# Patient Record
Sex: FEMALE | Race: OTHER | HISPANIC OR LATINO | URBAN - METROPOLITAN AREA
[De-identification: names, ages, dates, MRNs, and addresses within clinical notes are randomized per-mention and may not be internally consistent; named-entity substitution may affect disease eponyms.]

---

## 2017-12-31 ENCOUNTER — INPATIENT (INPATIENT)
Facility: HOSPITAL | Age: 67
LOS: 2 days | Discharge: NURSING, REHAB OR LONG-TERM CARE | DRG: 470 | End: 2018-01-03
Attending: ORTHOPAEDIC SURGERY | Admitting: ORTHOPAEDIC SURGERY
Payer: MEDICARE

## 2017-12-31 VITALS
SYSTOLIC BLOOD PRESSURE: 150 MMHG | WEIGHT: 160.06 LBS | TEMPERATURE: 98 F | HEART RATE: 73 BPM | RESPIRATION RATE: 22 BRPM | DIASTOLIC BLOOD PRESSURE: 74 MMHG | HEIGHT: 63 IN | OXYGEN SATURATION: 94 %

## 2017-12-31 LAB
ALBUMIN SERPL ELPH-MCNC: 4.4 G/DL — SIGNIFICANT CHANGE UP (ref 3.3–5)
ALP SERPL-CCNC: 100 U/L — SIGNIFICANT CHANGE UP (ref 40–120)
ALT FLD-CCNC: 15 U/L — SIGNIFICANT CHANGE UP (ref 10–45)
ANION GAP SERPL CALC-SCNC: 15 MMOL/L — SIGNIFICANT CHANGE UP (ref 5–17)
APPEARANCE UR: CLEAR — SIGNIFICANT CHANGE UP
APTT BLD: 28.3 SEC — SIGNIFICANT CHANGE UP (ref 27.5–37.4)
AST SERPL-CCNC: 20 U/L — SIGNIFICANT CHANGE UP (ref 10–40)
BASOPHILS NFR BLD AUTO: 0.2 % — SIGNIFICANT CHANGE UP (ref 0–2)
BILIRUB SERPL-MCNC: 0.4 MG/DL — SIGNIFICANT CHANGE UP (ref 0.2–1.2)
BILIRUB UR-MCNC: NEGATIVE — SIGNIFICANT CHANGE UP
BLD GP AB SCN SERPL QL: NEGATIVE — SIGNIFICANT CHANGE UP
BUN SERPL-MCNC: 16 MG/DL — SIGNIFICANT CHANGE UP (ref 7–23)
CALCIUM SERPL-MCNC: 9.7 MG/DL — SIGNIFICANT CHANGE UP (ref 8.4–10.5)
CHLORIDE SERPL-SCNC: 99 MMOL/L — SIGNIFICANT CHANGE UP (ref 96–108)
CK MB CFR SERPL CALC: 2.2 NG/ML — SIGNIFICANT CHANGE UP (ref 0–6.7)
CK SERPL-CCNC: 132 U/L — SIGNIFICANT CHANGE UP (ref 25–170)
CO2 SERPL-SCNC: 25 MMOL/L — SIGNIFICANT CHANGE UP (ref 22–31)
COLOR SPEC: YELLOW — SIGNIFICANT CHANGE UP
CREAT SERPL-MCNC: 0.67 MG/DL — SIGNIFICANT CHANGE UP (ref 0.5–1.3)
DIFF PNL FLD: NEGATIVE — SIGNIFICANT CHANGE UP
EOSINOPHIL NFR BLD AUTO: 1.8 % — SIGNIFICANT CHANGE UP (ref 0–6)
GLUCOSE BLDC GLUCOMTR-MCNC: 122 MG/DL — HIGH (ref 70–99)
GLUCOSE BLDC GLUCOMTR-MCNC: 160 MG/DL — HIGH (ref 70–99)
GLUCOSE SERPL-MCNC: 127 MG/DL — HIGH (ref 70–99)
GLUCOSE UR QL: NEGATIVE — SIGNIFICANT CHANGE UP
HCG UR QL: NEGATIVE — SIGNIFICANT CHANGE UP
HCT VFR BLD CALC: 37.7 % — SIGNIFICANT CHANGE UP (ref 34.5–45)
HGB BLD-MCNC: 12.7 G/DL — SIGNIFICANT CHANGE UP (ref 11.5–15.5)
INR BLD: 1.01 — SIGNIFICANT CHANGE UP (ref 0.88–1.16)
KETONES UR-MCNC: NEGATIVE — SIGNIFICANT CHANGE UP
LEUKOCYTE ESTERASE UR-ACNC: NEGATIVE — SIGNIFICANT CHANGE UP
LYMPHOCYTES # BLD AUTO: 17.7 % — SIGNIFICANT CHANGE UP (ref 13–44)
MCHC RBC-ENTMCNC: 30.9 PG — SIGNIFICANT CHANGE UP (ref 27–34)
MCHC RBC-ENTMCNC: 33.7 G/DL — SIGNIFICANT CHANGE UP (ref 32–36)
MCV RBC AUTO: 91.7 FL — SIGNIFICANT CHANGE UP (ref 80–100)
MONOCYTES NFR BLD AUTO: 7.6 % — SIGNIFICANT CHANGE UP (ref 2–14)
MRSA PCR RESULT.: NEGATIVE — SIGNIFICANT CHANGE UP
NEUTROPHILS NFR BLD AUTO: 72.7 % — SIGNIFICANT CHANGE UP (ref 43–77)
NITRITE UR-MCNC: NEGATIVE — SIGNIFICANT CHANGE UP
PH UR: 6.5 — SIGNIFICANT CHANGE UP (ref 5–8)
PLATELET # BLD AUTO: 255 K/UL — SIGNIFICANT CHANGE UP (ref 150–400)
POTASSIUM SERPL-MCNC: 3.9 MMOL/L — SIGNIFICANT CHANGE UP (ref 3.5–5.3)
POTASSIUM SERPL-SCNC: 3.9 MMOL/L — SIGNIFICANT CHANGE UP (ref 3.5–5.3)
PROT SERPL-MCNC: 7.6 G/DL — SIGNIFICANT CHANGE UP (ref 6–8.3)
PROT UR-MCNC: NEGATIVE MG/DL — SIGNIFICANT CHANGE UP
PROTHROM AB SERPL-ACNC: 11.2 SEC — SIGNIFICANT CHANGE UP (ref 9.8–12.7)
RBC # BLD: 4.11 M/UL — SIGNIFICANT CHANGE UP (ref 3.8–5.2)
RBC # FLD: 12.4 % — SIGNIFICANT CHANGE UP (ref 10.3–16.9)
RH IG SCN BLD-IMP: POSITIVE — SIGNIFICANT CHANGE UP
S AUREUS DNA NOSE QL NAA+PROBE: POSITIVE
SODIUM SERPL-SCNC: 139 MMOL/L — SIGNIFICANT CHANGE UP (ref 135–145)
SP GR SPEC: 1.01 — SIGNIFICANT CHANGE UP (ref 1–1.03)
UROBILINOGEN FLD QL: 0.2 E.U./DL — SIGNIFICANT CHANGE UP
WBC # BLD: 8.3 K/UL — SIGNIFICANT CHANGE UP (ref 3.8–10.5)
WBC # FLD AUTO: 8.3 K/UL — SIGNIFICANT CHANGE UP (ref 3.8–10.5)

## 2017-12-31 PROCEDURE — 99285 EMERGENCY DEPT VISIT HI MDM: CPT | Mod: 25

## 2017-12-31 PROCEDURE — 99222 1ST HOSP IP/OBS MODERATE 55: CPT

## 2017-12-31 PROCEDURE — 71010: CPT | Mod: 26

## 2017-12-31 PROCEDURE — 71010: CPT | Mod: 26,76

## 2017-12-31 PROCEDURE — 99233 SBSQ HOSP IP/OBS HIGH 50: CPT | Mod: GC

## 2017-12-31 PROCEDURE — 73560 X-RAY EXAM OF KNEE 1 OR 2: CPT | Mod: 26,50

## 2017-12-31 PROCEDURE — 93010 ELECTROCARDIOGRAM REPORT: CPT

## 2017-12-31 PROCEDURE — 73502 X-RAY EXAM HIP UNI 2-3 VIEWS: CPT | Mod: 26,RT

## 2017-12-31 PROCEDURE — 73502 X-RAY EXAM HIP UNI 2-3 VIEWS: CPT | Mod: 26

## 2017-12-31 PROCEDURE — 93306 TTE W/DOPPLER COMPLETE: CPT | Mod: 26

## 2017-12-31 RX ORDER — SODIUM CHLORIDE 9 MG/ML
1000 INJECTION, SOLUTION INTRAVENOUS
Qty: 0 | Refills: 0 | Status: DISCONTINUED | OUTPATIENT
Start: 2017-12-31 | End: 2018-01-02

## 2017-12-31 RX ORDER — SODIUM CHLORIDE 9 MG/ML
1000 INJECTION, SOLUTION INTRAVENOUS
Qty: 0 | Refills: 0 | Status: DISCONTINUED | OUTPATIENT
Start: 2017-12-31 | End: 2018-01-03

## 2017-12-31 RX ORDER — HYDROMORPHONE HYDROCHLORIDE 2 MG/ML
1 INJECTION INTRAMUSCULAR; INTRAVENOUS; SUBCUTANEOUS ONCE
Qty: 0 | Refills: 0 | Status: DISCONTINUED | OUTPATIENT
Start: 2017-12-31 | End: 2017-12-31

## 2017-12-31 RX ORDER — DEXTROSE 50 % IN WATER 50 %
25 SYRINGE (ML) INTRAVENOUS ONCE
Qty: 0 | Refills: 0 | Status: DISCONTINUED | OUTPATIENT
Start: 2017-12-31 | End: 2018-01-03

## 2017-12-31 RX ORDER — ACETAMINOPHEN 500 MG
650 TABLET ORAL EVERY 6 HOURS
Qty: 0 | Refills: 0 | Status: DISCONTINUED | OUTPATIENT
Start: 2017-12-31 | End: 2018-01-02

## 2017-12-31 RX ORDER — GLUCAGON INJECTION, SOLUTION 0.5 MG/.1ML
1 INJECTION, SOLUTION SUBCUTANEOUS ONCE
Qty: 0 | Refills: 0 | Status: DISCONTINUED | OUTPATIENT
Start: 2017-12-31 | End: 2018-01-03

## 2017-12-31 RX ORDER — INSULIN LISPRO 100/ML
VIAL (ML) SUBCUTANEOUS
Qty: 0 | Refills: 0 | Status: DISCONTINUED | OUTPATIENT
Start: 2017-12-31 | End: 2018-01-02

## 2017-12-31 RX ORDER — METOCLOPRAMIDE HCL 10 MG
10 TABLET ORAL EVERY 6 HOURS
Qty: 0 | Refills: 0 | Status: DISCONTINUED | OUTPATIENT
Start: 2017-12-31 | End: 2018-01-03

## 2017-12-31 RX ORDER — ZALEPLON 10 MG
5 CAPSULE ORAL AT BEDTIME
Qty: 0 | Refills: 0 | Status: DISCONTINUED | OUTPATIENT
Start: 2017-12-31 | End: 2018-01-03

## 2017-12-31 RX ORDER — DEXTROSE 50 % IN WATER 50 %
12.5 SYRINGE (ML) INTRAVENOUS ONCE
Qty: 0 | Refills: 0 | Status: DISCONTINUED | OUTPATIENT
Start: 2017-12-31 | End: 2018-01-03

## 2017-12-31 RX ORDER — DOCUSATE SODIUM 100 MG
100 CAPSULE ORAL THREE TIMES A DAY
Qty: 0 | Refills: 0 | Status: DISCONTINUED | OUTPATIENT
Start: 2017-12-31 | End: 2018-01-03

## 2017-12-31 RX ORDER — HYDROMORPHONE HYDROCHLORIDE 2 MG/ML
0.5 INJECTION INTRAMUSCULAR; INTRAVENOUS; SUBCUTANEOUS EVERY 4 HOURS
Qty: 0 | Refills: 0 | Status: DISCONTINUED | OUTPATIENT
Start: 2017-12-31 | End: 2017-12-31

## 2017-12-31 RX ORDER — OXYCODONE HYDROCHLORIDE 5 MG/1
10 TABLET ORAL EVERY 4 HOURS
Qty: 0 | Refills: 0 | Status: DISCONTINUED | OUTPATIENT
Start: 2017-12-31 | End: 2018-01-03

## 2017-12-31 RX ORDER — MORPHINE SULFATE 50 MG/1
4 CAPSULE, EXTENDED RELEASE ORAL ONCE
Qty: 0 | Refills: 0 | Status: DISCONTINUED | OUTPATIENT
Start: 2017-12-31 | End: 2017-12-31

## 2017-12-31 RX ORDER — OXYCODONE HYDROCHLORIDE 5 MG/1
5 TABLET ORAL EVERY 4 HOURS
Qty: 0 | Refills: 0 | Status: DISCONTINUED | OUTPATIENT
Start: 2017-12-31 | End: 2018-01-03

## 2017-12-31 RX ORDER — HYDROMORPHONE HYDROCHLORIDE 2 MG/ML
0.5 INJECTION INTRAMUSCULAR; INTRAVENOUS; SUBCUTANEOUS
Qty: 0 | Refills: 0 | Status: DISCONTINUED | OUTPATIENT
Start: 2017-12-31 | End: 2018-01-02

## 2017-12-31 RX ORDER — FAMOTIDINE 10 MG/ML
20 INJECTION INTRAVENOUS EVERY 12 HOURS
Qty: 0 | Refills: 0 | Status: DISCONTINUED | OUTPATIENT
Start: 2017-12-31 | End: 2018-01-03

## 2017-12-31 RX ORDER — ACETAMINOPHEN 500 MG
650 TABLET ORAL EVERY 6 HOURS
Qty: 0 | Refills: 0 | Status: DISCONTINUED | OUTPATIENT
Start: 2017-12-31 | End: 2018-01-03

## 2017-12-31 RX ORDER — DEXTROSE 50 % IN WATER 50 %
1 SYRINGE (ML) INTRAVENOUS ONCE
Qty: 0 | Refills: 0 | Status: DISCONTINUED | OUTPATIENT
Start: 2017-12-31 | End: 2018-01-03

## 2017-12-31 RX ADMIN — MORPHINE SULFATE 4 MILLIGRAM(S): 50 CAPSULE, EXTENDED RELEASE ORAL at 15:28

## 2017-12-31 RX ADMIN — SODIUM CHLORIDE 70 MILLILITER(S): 9 INJECTION, SOLUTION INTRAVENOUS at 16:36

## 2017-12-31 RX ADMIN — HYDROMORPHONE HYDROCHLORIDE 1 MILLIGRAM(S): 2 INJECTION INTRAMUSCULAR; INTRAVENOUS; SUBCUTANEOUS at 20:03

## 2017-12-31 RX ADMIN — OXYCODONE HYDROCHLORIDE 10 MILLIGRAM(S): 5 TABLET ORAL at 18:47

## 2017-12-31 RX ADMIN — Medication 10 MILLIGRAM(S): at 22:00

## 2017-12-31 RX ADMIN — MORPHINE SULFATE 4 MILLIGRAM(S): 50 CAPSULE, EXTENDED RELEASE ORAL at 15:56

## 2017-12-31 RX ADMIN — HYDROMORPHONE HYDROCHLORIDE 0.5 MILLIGRAM(S): 2 INJECTION INTRAMUSCULAR; INTRAVENOUS; SUBCUTANEOUS at 17:01

## 2017-12-31 RX ADMIN — Medication 5 MILLIGRAM(S): at 22:56

## 2017-12-31 RX ADMIN — FAMOTIDINE 20 MILLIGRAM(S): 10 INJECTION INTRAVENOUS at 17:46

## 2017-12-31 RX ADMIN — MORPHINE SULFATE 4 MILLIGRAM(S): 50 CAPSULE, EXTENDED RELEASE ORAL at 12:24

## 2017-12-31 RX ADMIN — HYDROMORPHONE HYDROCHLORIDE 1 MILLIGRAM(S): 2 INJECTION INTRAMUSCULAR; INTRAVENOUS; SUBCUTANEOUS at 19:48

## 2017-12-31 RX ADMIN — OXYCODONE HYDROCHLORIDE 10 MILLIGRAM(S): 5 TABLET ORAL at 22:47

## 2017-12-31 RX ADMIN — MORPHINE SULFATE 4 MILLIGRAM(S): 50 CAPSULE, EXTENDED RELEASE ORAL at 12:52

## 2017-12-31 RX ADMIN — OXYCODONE HYDROCHLORIDE 10 MILLIGRAM(S): 5 TABLET ORAL at 17:47

## 2017-12-31 RX ADMIN — HYDROMORPHONE HYDROCHLORIDE 0.5 MILLIGRAM(S): 2 INJECTION INTRAMUSCULAR; INTRAVENOUS; SUBCUTANEOUS at 16:46

## 2017-12-31 RX ADMIN — OXYCODONE HYDROCHLORIDE 10 MILLIGRAM(S): 5 TABLET ORAL at 21:37

## 2017-12-31 NOTE — CONSULT NOTE ADULT - SUBJECTIVE AND OBJECTIVE BOX
Pt is a 68yo f, thoracic aortic aneurysm s/p repair, hx kidney stones, who p/w c/o severe r hip pain s/p fall onto hip today. No head trauma/ loc. No n/t/w. No injury elsewhere. No neck/ back pain. No cp, sob, palp, abd pain. Pt unable to stand and bear wt after fall. Pt denies any chest pain, sob or worsening exercise tolerance. She can walk up two flights of stairs without getting SOB. She denies any hx of CAD, IDDM, CKD Medicine Consult Note    HPI:    Pt is a 67yof w/ PMHx of thoracic aortic aneurysm s/p repair in , hx of kidney stones, HLD and pre-diabetes (on metformin) who sustained a R femoral neck fracture after mechanical fall at home today. No prior hx of falls per family. No head trauma or LOC. N Pt denied CP, SOB or palpitations. No prior hx of cardioac abnormalities, has annual check up with cardiologist at [facility]. No prior hx of MI or CHF but was noted to have new LBBB on EKG done upon admission to [facility] today. Cardiology saw patient and cleared her from cardiac standpoint.   Patient able to climb greater than 2 flights of stairs at home without getting SOB.        PAST MEDICAL & SURGICAL HISTORY:  Hyperlipidemia  Diabetes  Calculus of kidney: Nephrolithiasis  Other postprocedural status: S/P aortic aneurysm repair  History of surgery to major organs, presenting hazards to health: History of nephrolithotomy with removal of calculi  History of surgery to major organs, presenting hazards to health: History of lithotripsy      FAMILY HISTORY:  No pertinent family history in first degree relatives      SOCIAL HISTORY:  Smoking Status: [ ] Current, [ ] Former, [x ] Never  Pack Years:    MEDICATIONS:    GI/:  docusate sodium 100 milliGRAM(s) Oral three times a day  famotidine    Tablet 20 milliGRAM(s) Oral every 12 hours    Endocrine:  dextrose 50% Injectable 12.5 Gram(s) IV Push once  dextrose 50% Injectable 25 Gram(s) IV Push once  dextrose 50% Injectable 25 Gram(s) IV Push once  dextrose Gel 1 Dose(s) Oral once PRN  glucagon  Injectable 1 milliGRAM(s) IntraMuscular once PRN  insulin lispro (HumaLOG) corrective regimen sliding scale   SubCutaneous three times a day before meals    Cardiac:    Other Medications:  acetaminophen   Tablet 650 milliGRAM(s) Oral every 6 hours PRN  acetaminophen   Tablet. 650 milliGRAM(s) Oral every 6 hours PRN  dextrose 5%. 1000 milliLiter(s) IV Continuous <Continuous>  HYDROmorphone  Injectable 0.5 milliGRAM(s) IV Push every 4 hours PRN  lactated ringers. 1000 milliLiter(s) IV Continuous <Continuous>  metoclopramide Injectable 10 milliGRAM(s) IV Push every 6 hours PRN  oxyCODONE    IR 10 milliGRAM(s) Oral every 4 hours PRN  oxyCODONE    IR 5 milliGRAM(s) Oral every 4 hours PRN  zaleplon 5 milliGRAM(s) Oral at bedtime PRN      Allergies    No Known Drug Allergies  Seafood (Rash)    Intolerances        Vital Signs Last 24 Hrs  T(C): 37.1 (31 Dec 2017 16:45), Max: 37.3 (31 Dec 2017 12:10)  T(F): 98.7 (31 Dec 2017 16:45), Max: 99.1 (31 Dec 2017 12:10)  HR: 71 (31 Dec 2017 16:45) (69 - 90)  BP: 120/55 (31 Dec 2017 16:45) (120/55 - 157/77)  BP(mean): --  RR: 18 (31 Dec 2017 16:45) (18 - 22)  SpO2: 96% (31 Dec 2017 16:45) (94% - 98%)     @ 07:01  -   @ 17:54  --------------------------------------------------------  IN: 140 mL / OUT: 300 mL / NET: -160 mL          LABS:      CBC Full  -  ( 31 Dec 2017 12:24 )  WBC Count : 8.3 K/uL  Hemoglobin : 12.7 g/dL  Hematocrit : 37.7 %  Platelet Count - Automated : 255 K/uL  Mean Cell Volume : 91.7 fL  Mean Cell Hemoglobin : 30.9 pg  Mean Cell Hemoglobin Concentration : 33.7 g/dL  Auto Neutrophil # : x  Auto Lymphocyte # : x  Auto Monocyte # : x  Auto Eosinophil # : x  Auto Basophil # : x  Auto Neutrophil % : 72.7 %  Auto Lymphocyte % : 17.7 %  Auto Monocyte % : 7.6 %  Auto Eosinophil % : 1.8 %  Auto Basophil % : 0.2 %        139  |  99  |  16  ----------------------------<  127<H>  3.9   |  25  |  0.67    Ca    9.7      31 Dec 2017 12:24    TPro  7.6  /  Alb  4.4  /  TBili  0.4  /  DBili  x   /  AST  20  /  ALT  15  /  AlkPhos  100  12-    PT/INR - ( 31 Dec 2017 12:24 )   PT: 11.2 sec;   INR: 1.01          PTT - ( 31 Dec 2017 12:24 )  PTT:28.3 sec      Urinalysis Basic - ( 31 Dec 2017 15:01 )    Color: Yellow / Appearance: Clear / S.015 / pH: x  Gluc: x / Ketone: NEGATIVE  / Bili: Negative / Urobili: 0.2 E.U./dL   Blood: x / Protein: NEGATIVE mg/dL / Nitrite: NEGATIVE   Leuk Esterase: NEGATIVE / RBC: x / WBC x   Sq Epi: x / Non Sq Epi: x / Bacteria: x        RADIOLOGY & ADDITIONAL STUDIES (The following images were personally reviewed):    EKG w/ new LBBB'  CXR w/o any significant abnormalities    Cardiovascular Risk Stratification - RCRI =  (2)  1. History of ischemic heart disease   2. History of congestive heart failure   3. History of cerebrovascular disease (stroke or transient ischemic attack)   4. History of diabetes requiring preoperative insulin use   5. Chronic kidney disease (creatinine > 2 mg/dL)   6. Undergoing suprainguinal vascular, intraperitoneal, or intrathoracic surgery   0 predictors = 0.4%, 1 predictor = 0.9%, 2 predictors = 6.6%, =3 predictors = >11%    - Overall this patient is as __% risk (for cardiac death, nonfatal myocardial infarction, and nonfatal cardiac arrest perioperatively for this low  risk procedure. Medicine Consult Note    HPI:    Pt is a 67yof w/ PMHx of thoracic aortic aneurysm s/p repair in , hx of kidney stones, HLD and pre-diabetes (on metformin) who sustained a R femoral neck fracture after mechanical fall at home today. No prior hx of falls per family. No head trauma or LOC. N Pt denied CP, SOB or palpitations. No prior hx of cardioac abnormalities, has annual check up with cardiologist at [facility]. No prior hx of MI or CHF but was noted to have new LBBB on EKG done upon admission to [facility] today. Cardiac enzymes were negative. Cardiology saw patient and cleared her from cardiac standpoint.   Patient able to climb greater than 2 flights of stairs at home without getting SOB.    PAST MEDICAL & SURGICAL HISTORY:  Hyperlipidemia  Diabetes  Calculus of kidney: Nephrolithiasis  Other postprocedural status: S/P aortic aneurysm repair  History of surgery to major organs, presenting hazards to health: History of nephrolithotomy with removal of calculi  History of surgery to major organs, presenting hazards to health: History of lithotripsy      FAMILY HISTORY:  No pertinent family history in first degree relatives      SOCIAL HISTORY:  Smoking Status: [ ] Current, [ ] Former, [x ] Never  Pack Years:    MEDICATIONS:    GI/:  docusate sodium 100 milliGRAM(s) Oral three times a day  famotidine    Tablet 20 milliGRAM(s) Oral every 12 hours    Endocrine:  dextrose 50% Injectable 12.5 Gram(s) IV Push once  dextrose 50% Injectable 25 Gram(s) IV Push once  dextrose 50% Injectable 25 Gram(s) IV Push once  dextrose Gel 1 Dose(s) Oral once PRN  glucagon  Injectable 1 milliGRAM(s) IntraMuscular once PRN  insulin lispro (HumaLOG) corrective regimen sliding scale   SubCutaneous three times a day before meals    Cardiac:    Other Medications:  acetaminophen   Tablet 650 milliGRAM(s) Oral every 6 hours PRN  acetaminophen   Tablet. 650 milliGRAM(s) Oral every 6 hours PRN  dextrose 5%. 1000 milliLiter(s) IV Continuous <Continuous>  HYDROmorphone  Injectable 0.5 milliGRAM(s) IV Push every 4 hours PRN  lactated ringers. 1000 milliLiter(s) IV Continuous <Continuous>  metoclopramide Injectable 10 milliGRAM(s) IV Push every 6 hours PRN  oxyCODONE    IR 10 milliGRAM(s) Oral every 4 hours PRN  oxyCODONE    IR 5 milliGRAM(s) Oral every 4 hours PRN  zaleplon 5 milliGRAM(s) Oral at bedtime PRN      Allergies    No Known Drug Allergies  Seafood (Rash)    Intolerances        Vital Signs Last 24 Hrs  T(C): 37.1 (31 Dec 2017 16:45), Max: 37.3 (31 Dec 2017 12:10)  T(F): 98.7 (31 Dec 2017 16:45), Max: 99.1 (31 Dec 2017 12:10)  HR: 71 (31 Dec 2017 16:45) (69 - 90)  BP: 120/55 (31 Dec 2017 16:45) (120/55 - 157/77)  BP(mean): --  RR: 18 (31 Dec 2017 16:45) (18 - 22)  SpO2: 96% (31 Dec 2017 16:45) (94% - 98%)     @ 07:01  -   @ 17:54  --------------------------------------------------------  IN: 140 mL / OUT: 300 mL / NET: -160 mL          LABS:      CBC Full  -  ( 31 Dec 2017 12:24 )  WBC Count : 8.3 K/uL  Hemoglobin : 12.7 g/dL  Hematocrit : 37.7 %  Platelet Count - Automated : 255 K/uL  Mean Cell Volume : 91.7 fL  Mean Cell Hemoglobin : 30.9 pg  Mean Cell Hemoglobin Concentration : 33.7 g/dL  Auto Neutrophil # : x  Auto Lymphocyte # : x  Auto Monocyte # : x  Auto Eosinophil # : x  Auto Basophil # : x  Auto Neutrophil % : 72.7 %  Auto Lymphocyte % : 17.7 %  Auto Monocyte % : 7.6 %  Auto Eosinophil % : 1.8 %  Auto Basophil % : 0.2 %        139  |  99  |  16  ----------------------------<  127<H>  3.9   |  25  |  0.67    Ca    9.7      31 Dec 2017 12:24    TPro  7.6  /  Alb  4.4  /  TBili  0.4  /  DBili  x   /  AST  20  /  ALT  15  /  AlkPhos  100  12-    PT/INR - ( 31 Dec 2017 12:24 )   PT: 11.2 sec;   INR: 1.01          PTT - ( 31 Dec 2017 12:24 )  PTT:28.3 sec      Urinalysis Basic - ( 31 Dec 2017 15:01 )    Color: Yellow / Appearance: Clear / S.015 / pH: x  Gluc: x / Ketone: NEGATIVE  / Bili: Negative / Urobili: 0.2 E.U./dL   Blood: x / Protein: NEGATIVE mg/dL / Nitrite: NEGATIVE   Leuk Esterase: NEGATIVE / RBC: x / WBC x   Sq Epi: x / Non Sq Epi: x / Bacteria: x        RADIOLOGY & ADDITIONAL STUDIES (The following images were personally reviewed):    EKG w/ new LBBB  CXR w/o any significant abnormalities  Echo: EF 55-60%. Abnormal septal wall motion. Mild AI. Mild MR. Trace pericardial effusion      Cardiovascular Risk Stratification - RCRI =  1  1. History of ischemic heart disease 1 (based on echo findings of abnormal septal wall motion)  2. History of congestive heart failure no  3. History of cerebrovascular disease (stroke or transient ischemic attack) no  4. History of diabetes requiring preoperative insulin use no  5. Chronic kidney disease (creatinine > 2 mg/dL) no  6. Undergoing suprainguinal vascular, intraperitoneal, or intrathoracic surgery no  0 predictors = 0.4%, 1 predictor = 0.9%, 2 predictors = 6.6%, =3 predictors = >11%    - Overall this patient is as 0.9% risk (for cardiac death, nonfatal myocardial infarction, and nonfatal cardiac arrest perioperatively for this intermediate risk procedure.    >4 METS

## 2017-12-31 NOTE — ED PROVIDER NOTE - MEDICAL DECISION MAKING DETAILS
Impression: r hip fx s/p mechanical fall. NVI. No head trauma/ loc. Pt evaluated by ortho and to be admitted for orif. Labs unremarkable. EKG showing lbbb, new since '13. Trop wnl. No cp. Will admit to ortho svc and pt to undergo medical clearance.

## 2017-12-31 NOTE — ED ADULT NURSE NOTE - OTHER COMPLAINTS
Pt C/O pain to right groin, unable to ambulate. S/P fall, no LOC, no head injury, pt is on baby aspirin.  Pt denies any other pain, dizziness, SOB, fever.

## 2017-12-31 NOTE — PRE-OP CHECKLIST - SELECT TESTS ORDERED
INR/Urinalysis/PT/PTT/CXR/CBC/POCT Blood Glucose CXR/INR/CBC/POCT Blood Glucose/PT/PTT/Type and Screen/Urinalysis

## 2017-12-31 NOTE — CONSULT NOTE ADULT - ASSESSMENT
68 yo F with h/o thoracic aortic aneurysm repair, diabetes and new LBBB who is preop for R ORIF.     #1) Preoperative Optimization       -No Cardiovascular Contraindication to surgery        -Patient is acceptable risk for a moderate risk surgery w/ RCRI of 1       -Continue statin       -hold metformin prior to surgery       -Postoperative Telemetry       -hold ASA prior to surgery       -Will follow please recall with any questions or concerns   #2) DMT2       -currently euglycemic  -please hold metformin prior to surgery and initiate moderate dose sliding scale insulin regimen for glycemic control   #3) HLD       -cont home lipitor

## 2017-12-31 NOTE — CONSULT NOTE ADULT - SUBJECTIVE AND OBJECTIVE BOX
HPI:  68 yo F with h/o thoracic aortic aneurysm repair in 2014, diabetes (on metformin), and kidney stones who sustained a R femoral neck fracture after a mechanical fall at home. She has no other known cardiac history. Denies prior MI or CHF. No history of arrhythmias or strokes. Her EKG showed a LBBB which was new from prior in 2015. She follows with a cardiologist as an outpatient (Dr. Wang) for yearly check-ups. Claims to have a negative stress test last year.     At baseline can perform > 4 METS. Denies any chest pain, sob, palpitations, dizziness or lightheadedness.   Current complaint is hip pain.     ROS: A 10-point review of systems was otherwise negative.    PAST MEDICAL & SURGICAL HISTORY:  Hyperlipidemia  Diabetes  Calculus of kidney: Nephrolithiasis  Other postprocedural status: S/P aortic aneurysm repair  History of surgery to major organs, presenting hazards to health: History of nephrolithotomy with removal of calculi  History of surgery to major organs, presenting hazards to health: History of lithotripsy    SOCIAL HISTORY:  FAMILY HISTORY:  No pertinent family history in first degree relatives    ALLERGIES: 	  Allergies    No Known Drug Allergies  Seafood (Rash)    Intolerances    MEDICATIONS:  acetaminophen   Tablet 650 milliGRAM(s) Oral every 6 hours PRN  acetaminophen   Tablet. 650 milliGRAM(s) Oral every 6 hours PRN  dextrose 5%. 1000 milliLiter(s) IV Continuous <Continuous>  dextrose 50% Injectable 12.5 Gram(s) IV Push once  dextrose 50% Injectable 25 Gram(s) IV Push once  dextrose 50% Injectable 25 Gram(s) IV Push once  dextrose Gel 1 Dose(s) Oral once PRN  docusate sodium 100 milliGRAM(s) Oral three times a day  famotidine    Tablet 20 milliGRAM(s) Oral every 12 hours  glucagon  Injectable 1 milliGRAM(s) IntraMuscular once PRN  HYDROmorphone  Injectable 0.5 milliGRAM(s) IV Push every 4 hours PRN  insulin lispro (HumaLOG) corrective regimen sliding scale   SubCutaneous three times a day before meals  lactated ringers. 1000 milliLiter(s) IV Continuous <Continuous>  metoclopramide Injectable 10 milliGRAM(s) IV Push every 6 hours PRN  oxyCODONE    IR 10 milliGRAM(s) Oral every 4 hours PRN  oxyCODONE    IR 5 milliGRAM(s) Oral every 4 hours PRN  zaleplon 5 milliGRAM(s) Oral at bedtime PRN    PHYSICAL EXAM:  T(C): 37.1 (12-31-17 @ 16:45), Max: 37.3 (12-31-17 @ 12:10)  HR: 71 (12-31-17 @ 16:45) (69 - 90)  BP: 120/55 (12-31-17 @ 16:45) (120/55 - 157/77)  RR: 18 (12-31-17 @ 16:45) (18 - 22)  SpO2: 96% (12-31-17 @ 16:45) (94% - 98%)  Wt(kg): --    GEN: Lying flat in bed. Distressed from hip pain.   HEENT: NCAT, PERRL, EOMI. Mucosa moist. No JVD.   RESP: Speaking in full sentences. CTA b/l  CV: RRR, normal s1/s2. No m/r/g.  ABD: Soft, NTND. BS+  EXT: Warm. R leg shortened and externally rotated. Warm. No edema, clubbing, or cyanosis.   NEURO: AAOx3. No focal deficits.    I&O's Summary    31 Dec 2017 07:01  -  31 Dec 2017 17:09  --------------------------------------------------------  IN: 140 mL / OUT: 300 mL / NET: -160 mL    Height (cm): 160.02 (12-31 @ 11:37)  Weight (kg): 72.6 (12-31 @ 11:37)  BMI (kg/m2): 28.4 (12-31 @ 11:37)  BSA (m2): 1.76 (12-31 @ 11:37)  	  LABS:	 	    CARDIAC MARKERS:                          12.7   8.3   )-----------( 255      ( 31 Dec 2017 12:24 )             37.7     12-31    139  |  99  |  16  ----------------------------<  127<H>  3.9   |  25  |  0.67    Ca    9.7      31 Dec 2017 12:24    TPro  7.6  /  Alb  4.4  /  TBili  0.4  /  DBili  x   /  AST  20  /  ALT  15  /  AlkPhos  100  12-31    EKG: Normal sinus rhythm at 74 with LBBB, . The left bundle is new compared to EKG in 2015    Bedside ECHO (my read): EF 55-60%. Abnormal septal wall motion. Mild AI. Mild MR. Trace pericardial effusion.

## 2017-12-31 NOTE — ED ADULT NURSE NOTE - OBJECTIVE STATEMENT
67 year old female presents to the ED status post mechanical trip and fall at home, complaining of throbbing right hip pain. As per patient's daughter, "She was in the kitchen when her knee buckled and she fell to the ground." Denies numbness, tingling, back pain, chest pain, SOB, and any other associated symptoms. Right leg is partially externally rotated. PIV placed and labs sent. Will continue with plan of care.

## 2017-12-31 NOTE — ED ADULT TRIAGE NOTE - OTHER COMPLAINTS
Pt C/O pain to right groin, unable to ambulate. S/P fall, no LOC, no head injury, pt is on baby aspirin. Pt C/O pain to right groin, unable to ambulate. S/P fall, no LOC, no head injury, pt is on baby aspirin.  Pt denies any other pain, dizziness, SOB, fever.

## 2017-12-31 NOTE — H&P ADULT - PSH
History of surgery to major organs, presenting hazards to health  History of nephrolithotomy with removal of calculi  History of surgery to major organs, presenting hazards to health  History of lithotripsy  Other postprocedural status  S/P aortic aneurysm repair

## 2017-12-31 NOTE — CONSULT NOTE ADULT - ATTENDING COMMENTS
Pain control:  Increase dilaudid to q2 frequency.  Increase dose to 1 mg if 0.5mg is not adequate.  C/w morphine IV 4mg q4.    Preop:  LBBB.  Proceed to OR with cardiology recs.

## 2017-12-31 NOTE — ED PROVIDER NOTE - DIAGNOSTIC INTERPRETATION
ER Physician: Martha Ree  INTERPRETATION: + r femoral neck fracture with impaction; no soft tissue swelling noted; normal bony alignment.   ER Physician: June Ree  CHEST XRAY INTERPRETATION: lungs clear, heart shadow normal, bony structures intact, sternotomy wires.

## 2017-12-31 NOTE — ED PROVIDER NOTE - PHYSICAL EXAMINATION
VITAL SIGNS: I have reviewed nursing notes and confirm.  CONSTITUTIONAL: Well-developed; well-nourished; in mod painful distress.   SKIN:  warm and dry, no acute rash.   HEAD:  normocephalic, atraumatic.  EYES: PERRL, EOM intact; conjunctiva and sclera clear.  ENT: No nasal discharge; airway clear.   NECK: Supple; non tender.  CARD: S1, S2 normal; no murmurs, gallops, or rubs. Regular rate and rhythm.   RESP:  Clear to auscultation b/l, no wheezes, rales or rhonchi.  ABD: Normal bowel sounds; soft; non-distended; non-tender; no guarding/ rebound.  PELVIS: stable to rocking. + ttp to r lateral hip. No ttp to groin, femur, knee, tib-fib, ankle, foot. Unable to range at hip jt 2/2 pain. No shortening/ external rotation of rle. Distal pulses intact.   NEURO: Alert, oriented, motor/ sensation intact.  PSYCH: Cooperative, mood and affect appropriate.

## 2017-12-31 NOTE — ED PROVIDER NOTE - OBJECTIVE STATEMENT
Pt is a 68yo f, thoracic aortic aneurysm s/p repair, kidney stone, who p/w c/o severe r hip pain s/p fall onto hip today. No head trauma/ loc. No n/t/w. No injury elsewhere. No neck/ back pain. No cp, sob, palp, abd pain. Pt unable to stand and bear wt after fall.

## 2017-12-31 NOTE — H&P ADULT - HISTORY OF PRESENT ILLNESS
Pt is a 68yo f, thoracic aortic aneurysm s/p repair, hx kidney stones, who p/w c/o severe r hip pain s/p fall onto hip today. No head trauma/ loc. No n/t/w. No injury elsewhere. No neck/ back pain. No cp, sob, palp, abd pain. Pt unable to stand and bear wt after fall.

## 2017-12-31 NOTE — H&P ADULT - ASSESSMENT
67F w/ above exam / findings  - ZACK  - dmitriy  - preop  - clearance pending  - consented  - admit to ortho under Dr Green  - Pain control

## 2017-12-31 NOTE — CONSULT NOTE ADULT - ASSESSMENT
66 yo F with h/o thoracic aortic aneurysm repair, diabetes and new LBBB who is preop for R ORIF. Her RCRI score is 2 corresponding to 0.9% chance of perioperative MACE. She is an intermediate risk for a low risk procedure.     Recommendations:  - No further cardiac testing is indicated  - Maintain euglycemia with ISS  - Outpatient cardiology follow up  - Will discuss with Dr. Georgi Jimenez MD  Cardiology Fellow  [phone] 66 yo F with h/o thoracic aortic aneurysm repair, diabetes and new LBBB who is preop for R ORIF. Her RCRI score is 2 corresponding to 0.9% chance of perioperative MACE. She is an intermediate risk for a low risk procedure.     Recommendations:  - Abnormal septal motion on echo could be related to prior cardiac surgery. She has no signs of ACS or history of angina. Stable ischemic disease cannot be ruled out, but this should not preclude her surgery.  - No further cardiac testing is indicated at this time.  - Maintain euglycemia with ISS  - Outpatient cardiology follow up  - Discussed with Dr. Georgi Jimenez MD  Cardiology Fellow  [phone]

## 2018-01-01 ENCOUNTER — RESULT REVIEW (OUTPATIENT)
Age: 68
End: 2018-01-01

## 2018-01-01 LAB
ANION GAP SERPL CALC-SCNC: 13 MMOL/L — SIGNIFICANT CHANGE UP (ref 5–17)
BUN SERPL-MCNC: 14 MG/DL — SIGNIFICANT CHANGE UP (ref 7–23)
CALCIUM SERPL-MCNC: 9 MG/DL — SIGNIFICANT CHANGE UP (ref 8.4–10.5)
CHLORIDE SERPL-SCNC: 99 MMOL/L — SIGNIFICANT CHANGE UP (ref 96–108)
CO2 SERPL-SCNC: 26 MMOL/L — SIGNIFICANT CHANGE UP (ref 22–31)
CREAT SERPL-MCNC: 0.73 MG/DL — SIGNIFICANT CHANGE UP (ref 0.5–1.3)
GLUCOSE BLDC GLUCOMTR-MCNC: 107 MG/DL — HIGH (ref 70–99)
GLUCOSE BLDC GLUCOMTR-MCNC: 118 MG/DL — HIGH (ref 70–99)
GLUCOSE BLDC GLUCOMTR-MCNC: 118 MG/DL — HIGH (ref 70–99)
GLUCOSE BLDC GLUCOMTR-MCNC: 200 MG/DL — HIGH (ref 70–99)
GLUCOSE SERPL-MCNC: 131 MG/DL — HIGH (ref 70–99)
HBA1C BLD-MCNC: 6.2 % — HIGH (ref 4–5.6)
HCT VFR BLD CALC: 32.2 % — LOW (ref 34.5–45)
HCT VFR BLD CALC: 34.6 % — SIGNIFICANT CHANGE UP (ref 34.5–45)
HGB BLD-MCNC: 10.7 G/DL — LOW (ref 11.5–15.5)
HGB BLD-MCNC: 11.3 G/DL — LOW (ref 11.5–15.5)
MCHC RBC-ENTMCNC: 30 PG — SIGNIFICANT CHANGE UP (ref 27–34)
MCHC RBC-ENTMCNC: 30.4 PG — SIGNIFICANT CHANGE UP (ref 27–34)
MCHC RBC-ENTMCNC: 32.7 G/DL — SIGNIFICANT CHANGE UP (ref 32–36)
MCHC RBC-ENTMCNC: 33.2 G/DL — SIGNIFICANT CHANGE UP (ref 32–36)
MCV RBC AUTO: 91.5 FL — SIGNIFICANT CHANGE UP (ref 80–100)
MCV RBC AUTO: 91.8 FL — SIGNIFICANT CHANGE UP (ref 80–100)
PLATELET # BLD AUTO: 207 K/UL — SIGNIFICANT CHANGE UP (ref 150–400)
PLATELET # BLD AUTO: 251 K/UL — SIGNIFICANT CHANGE UP (ref 150–400)
POTASSIUM SERPL-MCNC: 3.9 MMOL/L — SIGNIFICANT CHANGE UP (ref 3.5–5.3)
POTASSIUM SERPL-SCNC: 3.9 MMOL/L — SIGNIFICANT CHANGE UP (ref 3.5–5.3)
RBC # BLD: 3.52 M/UL — LOW (ref 3.8–5.2)
RBC # BLD: 3.77 M/UL — LOW (ref 3.8–5.2)
RBC # FLD: 12.2 % — SIGNIFICANT CHANGE UP (ref 10.3–16.9)
RBC # FLD: 12.7 % — SIGNIFICANT CHANGE UP (ref 10.3–16.9)
SODIUM SERPL-SCNC: 138 MMOL/L — SIGNIFICANT CHANGE UP (ref 135–145)
WBC # BLD: 16.3 K/UL — HIGH (ref 3.8–10.5)
WBC # BLD: 8.8 K/UL — SIGNIFICANT CHANGE UP (ref 3.8–10.5)
WBC # FLD AUTO: 16.3 K/UL — HIGH (ref 3.8–10.5)
WBC # FLD AUTO: 8.8 K/UL — SIGNIFICANT CHANGE UP (ref 3.8–10.5)

## 2018-01-01 RX ORDER — ONDANSETRON 8 MG/1
4 TABLET, FILM COATED ORAL EVERY 6 HOURS
Qty: 0 | Refills: 0 | Status: DISCONTINUED | OUTPATIENT
Start: 2018-01-01 | End: 2018-01-03

## 2018-01-01 RX ORDER — POLYETHYLENE GLYCOL 3350 17 G/17G
17 POWDER, FOR SOLUTION ORAL DAILY
Qty: 0 | Refills: 0 | Status: DISCONTINUED | OUTPATIENT
Start: 2018-01-01 | End: 2018-01-03

## 2018-01-01 RX ORDER — BUPIVACAINE 13.3 MG/ML
20 INJECTION, SUSPENSION, LIPOSOMAL INFILTRATION ONCE
Qty: 0 | Refills: 0 | Status: DISCONTINUED | OUTPATIENT
Start: 2018-01-01 | End: 2018-01-03

## 2018-01-01 RX ORDER — ASPIRIN/CALCIUM CARB/MAGNESIUM 324 MG
325 TABLET ORAL
Qty: 0 | Refills: 0 | Status: DISCONTINUED | OUTPATIENT
Start: 2018-01-01 | End: 2018-01-03

## 2018-01-01 RX ORDER — HYDROMORPHONE HYDROCHLORIDE 2 MG/ML
0.5 INJECTION INTRAMUSCULAR; INTRAVENOUS; SUBCUTANEOUS
Qty: 0 | Refills: 0 | Status: DISCONTINUED | OUTPATIENT
Start: 2018-01-01 | End: 2018-01-02

## 2018-01-01 RX ORDER — POTASSIUM CHLORIDE 20 MEQ
10 PACKET (EA) ORAL ONCE
Qty: 0 | Refills: 0 | Status: COMPLETED | OUTPATIENT
Start: 2018-01-01 | End: 2018-01-01

## 2018-01-01 RX ORDER — CEFAZOLIN SODIUM 1 G
2000 VIAL (EA) INJECTION EVERY 8 HOURS
Qty: 0 | Refills: 0 | Status: COMPLETED | OUTPATIENT
Start: 2018-01-02 | End: 2018-01-02

## 2018-01-01 RX ORDER — SENNA PLUS 8.6 MG/1
2 TABLET ORAL AT BEDTIME
Qty: 0 | Refills: 0 | Status: DISCONTINUED | OUTPATIENT
Start: 2018-01-01 | End: 2018-01-03

## 2018-01-01 RX ADMIN — Medication 100 MILLIEQUIVALENT(S): at 07:55

## 2018-01-01 RX ADMIN — ONDANSETRON 4 MILLIGRAM(S): 8 TABLET, FILM COATED ORAL at 22:10

## 2018-01-01 RX ADMIN — HYDROMORPHONE HYDROCHLORIDE 0.5 MILLIGRAM(S): 2 INJECTION INTRAMUSCULAR; INTRAVENOUS; SUBCUTANEOUS at 04:41

## 2018-01-01 RX ADMIN — Medication 2: at 23:00

## 2018-01-01 RX ADMIN — SODIUM CHLORIDE 70 MILLILITER(S): 9 INJECTION, SOLUTION INTRAVENOUS at 07:32

## 2018-01-01 RX ADMIN — HYDROMORPHONE HYDROCHLORIDE 0.5 MILLIGRAM(S): 2 INJECTION INTRAMUSCULAR; INTRAVENOUS; SUBCUTANEOUS at 11:21

## 2018-01-01 RX ADMIN — HYDROMORPHONE HYDROCHLORIDE 0.5 MILLIGRAM(S): 2 INJECTION INTRAMUSCULAR; INTRAVENOUS; SUBCUTANEOUS at 04:58

## 2018-01-01 RX ADMIN — HYDROMORPHONE HYDROCHLORIDE 0.5 MILLIGRAM(S): 2 INJECTION INTRAMUSCULAR; INTRAVENOUS; SUBCUTANEOUS at 11:05

## 2018-01-01 NOTE — PROGRESS NOTE ADULT - SUBJECTIVE AND OBJECTIVE BOX
Orthopaedics Post Op Check    Procedure: R hip eneida  Surgeon: Norma Uribe comfortable, without complaints  Denies CP, SOB, N/V, numbness/tingling     Vital Signs Last 24 Hrs  T(C): 37.1 (01 Jan 2018 17:00), Max: 37.3 (01 Jan 2018 08:41)  T(F): 98.7 (01 Jan 2018 17:00), Max: 99.1 (01 Jan 2018 08:41)  HR: 77 (01 Jan 2018 17:00) (62 - 77)  BP: 146/67 (01 Jan 2018 17:00) (105/69 - 146/67)  BP(mean): --  RR: 16 (01 Jan 2018 17:00) (16 - 17)  SpO2: 93% (01 Jan 2018 17:00) (93% - 95%)  AVSS, NAD    Dressing C/D/I, Prevena  General: Pt Alert and oriented     Pulses: WWP, DP/PT 2+  Sensation: SILT  Motor: moving foot/leg spontaneously                          11.3   8.8   )-----------( 251      ( 01 Jan 2018 05:57 )             34.6   01-01    138  |  99  |  14  ----------------------------<  131<H>  3.9   |  26  |  0.73    Ca    9.0      01 Jan 2018 05:55    TPro  7.6  /  Alb  4.4  /  TBili  0.4  /  DBili  x   /  AST  20  /  ALT  15  /  AlkPhos  100  12-31        A/P: 67yFemale POD#0 s/p above procedure  - Stable  - Pain Control  - DVT ppx: ASA  - Post op abx: Ancef  - PT, WBS: WBAT  - F/U AM Labs    Marcus Sevilla MD, PGY-2  [phone] Orthopaedics Post Op Check    Procedure: R JOHNNIE  Surgeon: Norma Uribe comfortable, without complaints  Denies CP, SOB, N/V, numbness/tingling     Vital Signs Last 24 Hrs  T(C): 37.1 (01 Jan 2018 17:00), Max: 37.3 (01 Jan 2018 08:41)  T(F): 98.7 (01 Jan 2018 17:00), Max: 99.1 (01 Jan 2018 08:41)  HR: 77 (01 Jan 2018 17:00) (62 - 77)  BP: 146/67 (01 Jan 2018 17:00) (105/69 - 146/67)  BP(mean): --  RR: 16 (01 Jan 2018 17:00) (16 - 17)  SpO2: 93% (01 Jan 2018 17:00) (93% - 95%)  AVSS, NAD    Dressing C/D/I, Prevena  General: Pt Alert and oriented     Pulses: WWP, DP/PT 2+  Sensation: SILT  Motor: moving foot/leg spontaneously                          11.3   8.8   )-----------( 251      ( 01 Jan 2018 05:57 )             34.6   01-01    138  |  99  |  14  ----------------------------<  131<H>  3.9   |  26  |  0.73    Ca    9.0      01 Jan 2018 05:55    TPro  7.6  /  Alb  4.4  /  TBili  0.4  /  DBili  x   /  AST  20  /  ALT  15  /  AlkPhos  100  12-31        A/P: 67yFemale POD#0 s/p above procedure  - Stable  - Pain Control  - DVT ppx: ASA  - Post op abx: Ancef  - PT, WBS: WBAT  - F/U AM Labs    Marcus Sevilla MD, PGY-2  [phone]

## 2018-01-02 ENCOUNTER — TRANSCRIPTION ENCOUNTER (OUTPATIENT)
Age: 68
End: 2018-01-02

## 2018-01-02 LAB
ANION GAP SERPL CALC-SCNC: 11 MMOL/L — SIGNIFICANT CHANGE UP (ref 5–17)
BUN SERPL-MCNC: 11 MG/DL — SIGNIFICANT CHANGE UP (ref 7–23)
CALCIUM SERPL-MCNC: 8.5 MG/DL — SIGNIFICANT CHANGE UP (ref 8.4–10.5)
CHLORIDE SERPL-SCNC: 99 MMOL/L — SIGNIFICANT CHANGE UP (ref 96–108)
CO2 SERPL-SCNC: 28 MMOL/L — SIGNIFICANT CHANGE UP (ref 22–31)
CREAT SERPL-MCNC: 0.83 MG/DL — SIGNIFICANT CHANGE UP (ref 0.5–1.3)
GLUCOSE BLDC GLUCOMTR-MCNC: 115 MG/DL — HIGH (ref 70–99)
GLUCOSE BLDC GLUCOMTR-MCNC: 146 MG/DL — HIGH (ref 70–99)
GLUCOSE BLDC GLUCOMTR-MCNC: 173 MG/DL — HIGH (ref 70–99)
GLUCOSE BLDC GLUCOMTR-MCNC: 185 MG/DL — HIGH (ref 70–99)
GLUCOSE SERPL-MCNC: 122 MG/DL — HIGH (ref 70–99)
HCT VFR BLD CALC: 33.2 % — LOW (ref 34.5–45)
HGB BLD-MCNC: 10.7 G/DL — LOW (ref 11.5–15.5)
MCHC RBC-ENTMCNC: 29.6 PG — SIGNIFICANT CHANGE UP (ref 27–34)
MCHC RBC-ENTMCNC: 32.2 G/DL — SIGNIFICANT CHANGE UP (ref 32–36)
MCV RBC AUTO: 92 FL — SIGNIFICANT CHANGE UP (ref 80–100)
PLATELET # BLD AUTO: 206 K/UL — SIGNIFICANT CHANGE UP (ref 150–400)
POTASSIUM SERPL-MCNC: 4 MMOL/L — SIGNIFICANT CHANGE UP (ref 3.5–5.3)
POTASSIUM SERPL-SCNC: 4 MMOL/L — SIGNIFICANT CHANGE UP (ref 3.5–5.3)
RBC # BLD: 3.61 M/UL — LOW (ref 3.8–5.2)
RBC # FLD: 12.6 % — SIGNIFICANT CHANGE UP (ref 10.3–16.9)
SODIUM SERPL-SCNC: 138 MMOL/L — SIGNIFICANT CHANGE UP (ref 135–145)
WBC # BLD: 9.5 K/UL — SIGNIFICANT CHANGE UP (ref 3.8–10.5)
WBC # FLD AUTO: 9.5 K/UL — SIGNIFICANT CHANGE UP (ref 3.8–10.5)

## 2018-01-02 PROCEDURE — 99231 SBSQ HOSP IP/OBS SF/LOW 25: CPT

## 2018-01-02 PROCEDURE — 99233 SBSQ HOSP IP/OBS HIGH 50: CPT | Mod: GC

## 2018-01-02 RX ORDER — ACETAMINOPHEN 500 MG
1000 TABLET ORAL ONCE
Qty: 0 | Refills: 0 | Status: DISCONTINUED | OUTPATIENT
Start: 2018-01-02 | End: 2018-01-03

## 2018-01-02 RX ORDER — DEXTROSE 50 % IN WATER 50 %
25 SYRINGE (ML) INTRAVENOUS ONCE
Qty: 0 | Refills: 0 | Status: DISCONTINUED | OUTPATIENT
Start: 2018-01-02 | End: 2018-01-03

## 2018-01-02 RX ORDER — GLUCAGON INJECTION, SOLUTION 0.5 MG/.1ML
1 INJECTION, SOLUTION SUBCUTANEOUS ONCE
Qty: 0 | Refills: 0 | Status: DISCONTINUED | OUTPATIENT
Start: 2018-01-02 | End: 2018-01-03

## 2018-01-02 RX ORDER — POLYETHYLENE GLYCOL 3350 17 G/17G
17 POWDER, FOR SOLUTION ORAL
Qty: 0 | Refills: 0 | COMMUNITY
Start: 2018-01-02

## 2018-01-02 RX ORDER — SODIUM CHLORIDE 9 MG/ML
1000 INJECTION INTRAMUSCULAR; INTRAVENOUS; SUBCUTANEOUS
Qty: 0 | Refills: 0 | Status: DISCONTINUED | OUTPATIENT
Start: 2018-01-02 | End: 2018-01-03

## 2018-01-02 RX ORDER — SODIUM CHLORIDE 9 MG/ML
1000 INJECTION, SOLUTION INTRAVENOUS
Qty: 0 | Refills: 0 | Status: DISCONTINUED | OUTPATIENT
Start: 2018-01-02 | End: 2018-01-03

## 2018-01-02 RX ORDER — INSULIN LISPRO 100/ML
VIAL (ML) SUBCUTANEOUS
Qty: 0 | Refills: 0 | Status: DISCONTINUED | OUTPATIENT
Start: 2018-01-02 | End: 2018-01-03

## 2018-01-02 RX ORDER — DEXTROSE 50 % IN WATER 50 %
12.5 SYRINGE (ML) INTRAVENOUS ONCE
Qty: 0 | Refills: 0 | Status: DISCONTINUED | OUTPATIENT
Start: 2018-01-02 | End: 2018-01-03

## 2018-01-02 RX ORDER — FAMOTIDINE 10 MG/ML
1 INJECTION INTRAVENOUS
Qty: 0 | Refills: 0 | COMMUNITY
Start: 2018-01-02

## 2018-01-02 RX ORDER — SODIUM CHLORIDE 9 MG/ML
500 INJECTION INTRAMUSCULAR; INTRAVENOUS; SUBCUTANEOUS ONCE
Qty: 0 | Refills: 0 | Status: COMPLETED | OUTPATIENT
Start: 2018-01-02 | End: 2018-01-02

## 2018-01-02 RX ORDER — DEXTROSE 50 % IN WATER 50 %
1 SYRINGE (ML) INTRAVENOUS ONCE
Qty: 0 | Refills: 0 | Status: DISCONTINUED | OUTPATIENT
Start: 2018-01-02 | End: 2018-01-03

## 2018-01-02 RX ORDER — ACETAMINOPHEN 500 MG
975 TABLET ORAL EVERY 8 HOURS
Qty: 0 | Refills: 0 | Status: DISCONTINUED | OUTPATIENT
Start: 2018-01-02 | End: 2018-01-03

## 2018-01-02 RX ORDER — SENNA PLUS 8.6 MG/1
2 TABLET ORAL
Qty: 0 | Refills: 0 | COMMUNITY
Start: 2018-01-02

## 2018-01-02 RX ORDER — DOCUSATE SODIUM 100 MG
1 CAPSULE ORAL
Qty: 0 | Refills: 0 | COMMUNITY
Start: 2018-01-02

## 2018-01-02 RX ORDER — KETOROLAC TROMETHAMINE 30 MG/ML
15 SYRINGE (ML) INJECTION EVERY 6 HOURS
Qty: 0 | Refills: 0 | Status: COMPLETED | OUTPATIENT
Start: 2018-01-02 | End: 2018-01-03

## 2018-01-02 RX ADMIN — Medication 100 MILLIGRAM(S): at 21:10

## 2018-01-02 RX ADMIN — Medication 1: at 21:44

## 2018-01-02 RX ADMIN — SODIUM CHLORIDE 1000 MILLILITER(S): 9 INJECTION INTRAMUSCULAR; INTRAVENOUS; SUBCUTANEOUS at 13:02

## 2018-01-02 RX ADMIN — OXYCODONE HYDROCHLORIDE 10 MILLIGRAM(S): 5 TABLET ORAL at 07:44

## 2018-01-02 RX ADMIN — Medication 325 MILLIGRAM(S): at 06:25

## 2018-01-02 RX ADMIN — Medication 15 MILLIGRAM(S): at 17:21

## 2018-01-02 RX ADMIN — Medication 975 MILLIGRAM(S): at 10:50

## 2018-01-02 RX ADMIN — SODIUM CHLORIDE 120 MILLILITER(S): 9 INJECTION INTRAMUSCULAR; INTRAVENOUS; SUBCUTANEOUS at 14:10

## 2018-01-02 RX ADMIN — Medication 15 MILLIGRAM(S): at 10:24

## 2018-01-02 RX ADMIN — Medication 100 MILLIGRAM(S): at 03:52

## 2018-01-02 RX ADMIN — FAMOTIDINE 20 MILLIGRAM(S): 10 INJECTION INTRAVENOUS at 17:21

## 2018-01-02 RX ADMIN — FAMOTIDINE 20 MILLIGRAM(S): 10 INJECTION INTRAVENOUS at 06:25

## 2018-01-02 RX ADMIN — Medication 15 MILLIGRAM(S): at 23:17

## 2018-01-02 RX ADMIN — SODIUM CHLORIDE 1000 MILLILITER(S): 9 INJECTION INTRAMUSCULAR; INTRAVENOUS; SUBCUTANEOUS at 16:20

## 2018-01-02 RX ADMIN — Medication 15 MILLIGRAM(S): at 23:50

## 2018-01-02 RX ADMIN — Medication 975 MILLIGRAM(S): at 21:10

## 2018-01-02 RX ADMIN — Medication 975 MILLIGRAM(S): at 10:24

## 2018-01-02 RX ADMIN — Medication 975 MILLIGRAM(S): at 22:06

## 2018-01-02 RX ADMIN — Medication 325 MILLIGRAM(S): at 17:21

## 2018-01-02 RX ADMIN — Medication 15 MILLIGRAM(S): at 17:50

## 2018-01-02 RX ADMIN — Medication 15 MILLIGRAM(S): at 10:50

## 2018-01-02 RX ADMIN — Medication 1: at 17:22

## 2018-01-02 RX ADMIN — OXYCODONE HYDROCHLORIDE 10 MILLIGRAM(S): 5 TABLET ORAL at 08:30

## 2018-01-02 RX ADMIN — Medication 100 MILLIGRAM(S): at 12:30

## 2018-01-02 NOTE — PROGRESS NOTE ADULT - ASSESSMENT
68 yo F with h/o thoracic aortic aneurysm repair, h/o kidney stones, DMT2 (on metformin), presented for severe R hip pain 2/2 fall, now s/p R JOHNNIE, medicine consulted for pre-op optimization now with new LBBB.     # R hip fem neck fracture s/p R JOHNNIE   - pain control per ortho   - continue bowel regimen ppx    # DM T2 (on metformin at home)  - continue to hold metformin   - would start medium sliding scale     # New LBBB  - cardiology following    # PPx  - please start HSQ for DVT ppx given recent hip surgery 66 yo F with h/o thoracic aortic aneurysm repair, h/o kidney stones, DMT2 (on metformin), presented for severe R hip pain 2/2 fall, now s/p R JOHNNIE, medicine consulted for pre-op optimization now with new LBBB.     # R hip fem neck fracture s/p R JOHNNIE   - pain control per ortho   - continue bowel regimen ppx    # DM T2 (on metformin at home)  - continue to hold metformin   - would start medium sliding scale     # New LBBB  - cardiology following    # PPx  - c/w SCD's bilaterally, would consider starting HSQ for DVT ppx given recent hip surgery    # Dispo  - PT recommended JUJU 68 yo F with h/o thoracic aortic aneurysm repair, h/o kidney stones, DMT2 (on metformin), presented for severe R hip pain 2/2 fall, now s/p R JOHNNIE, medicine consulted for pre-op optimization now with new LBBB.     # R hip fem neck fracture s/p R JOHNNIE   - pain control per ortho   - continue bowel regimen ppx    # DM T2 (on metformin at home)  - continue to hold metformin   - would start medium sliding scale     # New LBBB  - cardiology following    # PPx  - c/w SCD's bilaterally, c/w ASA 325mg BID for DVT ppx    # Dispo  - PT recommended JUJU 68 yo F with h/o thoracic aortic aneurysm repair, h/o kidney stones, DMT2 (on metformin), presented for severe R hip pain 2/2 fall, now s/p R JOHNNIE, medicine consulted for pre-op optimization now with new LBBB.     # R hip fem neck fracture s/p R JOHNNIE   - pain control per ortho   - continue bowel regimen ppx    # Hypotension - pt's BP's /40-50s  - agree with starting IVF  - encourage increased PO intake   - would be judicious with oxycodone for pain as this can lower pt's BP's further  - use tylenol for for pain     # DM T2 (on metformin at home)  - continue to hold metformin   - would start medium sliding scale     # New LBBB  - cardiology following    # PPx  - c/w SCD's bilaterally, c/w ASA 325mg BID for DVT ppx    # Dispo  - PT recommended JUJU

## 2018-01-02 NOTE — PROGRESS NOTE ADULT - ASSESSMENT
A/P: 67 y s/p R JOHNNIE via anterolateral approach on 1/1/2018   -stable  -pain controlled  -PT/WBAT  -diet as tolerated  -f/u labs  -disposition: home

## 2018-01-02 NOTE — PHYSICAL THERAPY INITIAL EVALUATION ADULT - IMPAIRMENTS FOUND, PT EVAL
aerobic capacity/endurance/ergonomics and body mechanics/gait, locomotion, and balance/muscle strength/poor safety awareness/posture/ROM

## 2018-01-02 NOTE — PHYSICAL THERAPY INITIAL EVALUATION ADULT - ADDITIONAL COMMENTS
Patient refused  phone, family present to translate. Patient lives at home with family 6 steps to enter, 13 steps to floor with bathroom/bedroom. Patient uses a SC at baseline for ambulation/functional ADL's.

## 2018-01-02 NOTE — PHYSICAL THERAPY INITIAL EVALUATION ADULT - LEVEL OF INDEPENDENCE: GAIT, REHAB EVAL
Patient with pain/dizziness requiring return to supine. Patient able to stand x 1-2 minutes with mod A x 2 and RW./unable to perform

## 2018-01-02 NOTE — DISCHARGE NOTE ADULT - CARE PROVIDER_API CALL
Harshal Green), Orthopaedic Surgery  [address] [place] [address]  Phone: [phone]  Fax: [phone]

## 2018-01-02 NOTE — DISCHARGE NOTE ADULT - MEDICATION SUMMARY - MEDICATIONS TO CHANGE
I will SWITCH the dose or number of times a day I take the medications listed below when I get home from the hospital:    aspirin

## 2018-01-02 NOTE — PHYSICAL THERAPY INITIAL EVALUATION ADULT - GENERAL OBSERVATIONS, REHAB EVAL
Patient encountered supine in bed, NAD, family present, LAMONT Bateman cleared patient to ambulate, +IV, +provena, surgical site C/D/I (R) anterior thigh, +SCD's B/L.

## 2018-01-02 NOTE — PHYSICAL THERAPY INITIAL EVALUATION ADULT - PLANNED THERAPY INTERVENTIONS, PT EVAL
ROM/balance training/transfer training/bed mobility training/gait training/postural re-education/strengthening

## 2018-01-02 NOTE — DISCHARGE NOTE ADULT - ADDITIONAL INSTRUCTIONS
follow up  with your primary care provider Weight bearing as tolerated to right lower extremity with rolling walker and assistance.  No strenuous activity, heavy lifting, driving, tub bathing, or returning to work until cleared by MD.  Prevena to suction on Right hip can stay on for 7 days, when it cerna down, clean with chlorexedine and put an Aquacel (waterproof bandage) on the site.  Call Dr. Green's office to schedule an appointment within 10-14 days.  If you don't have a bowel movement by post op day 3, then take Milk of Magnesia.  If no bowel movement by at least post op day 5, then use a Dulcolax suppository and/or a Fleets enema--if still no bowel movement, call your MD.  Contact your doctor if you experience: fever greater than 101.5, chills, chest pain, difficulty breathing, bleeding, redness or heat around the incision.  Follow up with your primary care provider.

## 2018-01-02 NOTE — DISCHARGE NOTE ADULT - CARE PLAN
Goal:	improved ambulation and decreased pain after surgery  Instructions for follow-up, activity and diet:	Weight bearing as tolerated to right lower extremity with rolling walker and assistance.  No strenuous activity, heavy lifting, driving, tub bathing, or returning to work until cleared by MD.  Prevena to suction on Right hip can stay on for 7 days, when it cerna down, clean with chlorexedine and put an Aquacell (waterproof bandage) on the site.  Follow up with Dr. Green to schedule an appt within 10-14 days.  If you don't have a bowel movement by post op day 3, then take Milk of Magnesia (over the counter).  If no bowel movement by at least post op day 5, then use a Dulcolax suppository (over the counter) and/or a Fleets enema--if still no bowel movement, call your MD.  Contact your doctor if you experience: fever greater than 101.5, chills, chest pain, difficulty breathing, bleeding, redness or heat around the incision. Principal Discharge DX:	Closed fracture of right hip, initial encounter  Goal:	improved ambulation and decreased pain after surgery  Instructions for follow-up, activity and diet:	See below. Principal Discharge DX:	Closed fracture of right hip, initial encounter  Goal:	improved ambulation and decreased pain after surgery  Instructions for follow-up, activity and diet:	See below.  Secondary Diagnosis:	Left bundle branch block  Instructions for follow-up, activity and diet:	Follow up with your cardiologist to schedule a stress test. Your cardiologist may call our medical records office to obtain your Echocardiogram results. [phone].

## 2018-01-02 NOTE — DISCHARGE NOTE ADULT - PATIENT PORTAL LINK FT
“You can access the [facility] Patient Portal, offered by [facility], by registering with the following website: [email or web address]”

## 2018-01-02 NOTE — DISCHARGE NOTE ADULT - PLAN OF CARE
improved ambulation and decreased pain after surgery Weight bearing as tolerated to right lower extremity with rolling walker and assistance.  No strenuous activity, heavy lifting, driving, tub bathing, or returning to work until cleared by MD.  Prevena to suction on Right hip can stay on for 7 days, when it cerna down, clean with chlorexedine and put an Aquacell (waterproof bandage) on the site.  Follow up with Dr. Green to schedule an appt within 10-14 days.  If you don't have a bowel movement by post op day 3, then take Milk of Magnesia (over the counter).  If no bowel movement by at least post op day 5, then use a Dulcolax suppository (over the counter) and/or a Fleets enema--if still no bowel movement, call your MD.  Contact your doctor if you experience: fever greater than 101.5, chills, chest pain, difficulty breathing, bleeding, redness or heat around the incision. See below. Follow up with your cardiologist to schedule a stress test. Your cardiologist may call our medical records office to obtain your Echocardiogram results. [phone].

## 2018-01-02 NOTE — DISCHARGE NOTE ADULT - MEDICATION SUMMARY - MEDICATIONS TO TAKE
I will START or STAY ON the medications listed below when I get home from the hospital:    acetaminophen 325 mg oral tablet  -- 3 tab(s) by mouth every 8 hours  -- Indication: For Pain    oxyCODONE 10 mg oral tablet  -- 1 tab(s) by mouth every 4 hours, As needed, Severe Pain (7 - 10)  -- Indication: For Pain    oxyCODONE 5 mg oral tablet  -- 1 tab(s) by mouth every 4 hours, As needed, Moderate Pain (4 - 6)  -- Indication: For Pain    aspirin 325 mg oral delayed release tablet  -- 1 tab(s) by mouth 2 times a day  -- Indication: For DVT prophylaxis    insulin lispro 100 units/mL subcutaneous solution  --  subcutaneous 4 times a day (before meals and at bedtime); 1 Unit(s) if Glucose 151 - 200  2 Unit(s) if Glucose 201 - 250  3 Unit(s) if Glucose 251 - 300  4 Unit(s) if Glucose 301 - 350  5 Unit(s) if Glucose 351 - 400  6 Unit(s) if Glucose Greater Than 400  -- Indication: For Diabetes    metFORMIN  -- Indication: For Diabetes    pravastatin  -- Indication: For Hyperlipidemia    famotidine 20 mg oral tablet  -- 1 tab(s) by mouth every 12 hours  -- Indication: For GERD    bisacodyl 10 mg rectal suppository  -- 1 suppository(ies) rectally once a day, As needed, If no bowel movement by POD#2  -- Indication: For Constipation    docusate sodium 100 mg oral capsule  -- 1 cap(s) by mouth 3 times a day  -- Indication: For Constipation    polyethylene glycol 3350 oral powder for reconstitution  -- 17 gram(s) by mouth once a day  -- Indication: For Constipation    senna oral tablet  -- 2 tab(s) by mouth once a day (at bedtime), As needed, Constipation  -- Indication: For Constipation

## 2018-01-02 NOTE — PROGRESS NOTE ADULT - SUBJECTIVE AND OBJECTIVE BOX
Chief Complaint/Reason for Consult: periop cv mgmt  INTERVAL HPI: pain controlled, post op s complcations, echo reviewed  	  MEDICATIONS:        acetaminophen   Tablet 650 milliGRAM(s) Oral every 6 hours PRN  acetaminophen   Tablet. 975 milliGRAM(s) Oral every 8 hours  acetaminophen  IVPB. 1000 milliGRAM(s) IV Intermittent once  ketorolac   Injectable 15 milliGRAM(s) IV Push every 6 hours  metoclopramide Injectable 10 milliGRAM(s) IV Push every 6 hours PRN  ondansetron Injectable 4 milliGRAM(s) IV Push every 6 hours PRN  oxyCODONE    IR 10 milliGRAM(s) Oral every 4 hours PRN  oxyCODONE    IR 5 milliGRAM(s) Oral every 4 hours PRN  zaleplon 5 milliGRAM(s) Oral at bedtime PRN    aluminum hydroxide/magnesium hydroxide/simethicone Suspension 30 milliLiter(s) Oral four times a day PRN  bisacodyl Suppository 10 milliGRAM(s) Rectal daily PRN  docusate sodium 100 milliGRAM(s) Oral three times a day  famotidine    Tablet 20 milliGRAM(s) Oral every 12 hours  polyethylene glycol 3350 17 Gram(s) Oral daily  senna 2 Tablet(s) Oral at bedtime PRN    dextrose 50% Injectable 12.5 Gram(s) IV Push once  dextrose 50% Injectable 25 Gram(s) IV Push once  dextrose 50% Injectable 25 Gram(s) IV Push once  dextrose 50% Injectable 12.5 Gram(s) IV Push once  dextrose 50% Injectable 25 Gram(s) IV Push once  dextrose 50% Injectable 25 Gram(s) IV Push once  dextrose Gel 1 Dose(s) Oral once PRN  dextrose Gel 1 Dose(s) Oral once PRN  glucagon  Injectable 1 milliGRAM(s) IntraMuscular once PRN  glucagon  Injectable 1 milliGRAM(s) IntraMuscular once PRN  insulin lispro (HumaLOG) corrective regimen sliding scale   SubCutaneous Before meals and at bedtime    aspirin enteric coated 325 milliGRAM(s) Oral two times a day  dextrose 5%. 1000 milliLiter(s) IV Continuous <Continuous>  dextrose 5%. 1000 milliLiter(s) IV Continuous <Continuous>  sodium chloride 0.9%. 1000 milliLiter(s) IV Continuous <Continuous>      REVIEW OF SYSTEMS:  [x] As per HPI  CONSTITUTIONAL: No fever, weight loss, or fatigue  RESPIRATORY: No cough, wheezing, chills or hemoptysis; No Shortness of Breath  CARDIOVASCULAR: No chest pain, palpitations, dizziness, or leg swelling  GASTROINTESTINAL: No abdominal or epigastric pain. No nausea, vomiting, or hematemesis; No diarrhea or constipation. No melena or hematochezia.  MUSCULOSKELETAL: No joint pain or swelling; No muscle, back, or extremity pain  [x] All others negative	  [ ] Unable to obtain    PHYSICAL EXAM:  T(C): 36.5 (01-02-18 @ 12:45), Max: 37.3 (01-01-18 @ 22:05)  HR: 75 (01-02-18 @ 12:45) (53 - 78)  BP: 87/44 (01-02-18 @ 12:45) (87/44 - 164/53)  RR: 17 (01-02-18 @ 12:45) (6 - 21)  SpO2: 97% (01-02-18 @ 12:45) (88% - 100%)  Wt(kg): --  I&O's Summary    01 Jan 2018 07:01  -  02 Jan 2018 07:00  --------------------------------------------------------  IN: 745 mL / OUT: 1830 mL / NET: -1085 mL          Appearance: Normal	  HEENT:   Normal oral mucosa  Cardiovascular: Normal S1 S2, No JVD, No murmurs, No edema  Respiratory: Lungs clear to auscultation	  Gastrointestinal:  Soft, Non-tender, + BS	  Extremities: Normal range of motion, No clubbing, cyanosis or edema  Vascular: Peripheral pulses palpable 2+ bilaterally    TELEMETRY: 	    ECG: < from: 12 Lead ECG (12.31.17 @ 12:29) >  Diagnosis Line Normal sinus rhythm  Left bundle branch block    < end of copied text >    	  RADIOLOGY:   CXR:  CT:  US:    CARDIAC TESTING:  Echocardiogram: < from: Echocardiogram (01.02.18 @ 08:55) >  TTE was performed by cardiology fellow on call. Normal left ventricular   size   and wall thickness.Abnormal (paradoxical) septal motion consistent with   abnormal conductionThere is basal inferior wall mild hypokinesis.The left   ventricular ejection fraction is normal.The left ventricular ejection   fraction   is 55%.The right ventricle is normal in size and function.The left   atrial size   is normal.The mitral inflow pattern is consistent with impaired left   ventricular relaxation with mildly elevated left atrial pressure   (8-14mmHg).   Right atrial size is normal.Structurally normal aortic valve.There is   mild to   moderate aortic regurgitation.Structurally normal mitral valve.There is   mild   to moderate mitral regurgitation.Structurally normal tricuspid   valve.There is   trace tricuspid regurgitation.There was insufficient TR detected from   which to   calculate pulmonary artery systolic pressure.  Structurally normal   pulmonic   valve.There is trace pulmonic regurgitation.There is no pericardial   effusion.    < end of copied text >    Catheterization:  Stress Test:      LABS:	 	    CARDIAC MARKERS:                                  10.7   9.5   )-----------( 206      ( 02 Jan 2018 07:52 )             33.2     01-02    138  |  99  |  11  ----------------------------<  122<H>  4.0   |  28  |  0.83    Ca    8.5      02 Jan 2018 07:52      proBNP:   Lipid Profile:   HgA1c:   TSH:     ASSESSMENT/PLAN: 	      #CAD - post op s complications echo with inf HK.  Given LBBB and inf HK likely old silent MI.  recommend ASA/statin therapy for now and outpatient stress test.        #CV Prevention -   q3mo Fasting Lipid Profile, Goal LDL<100, statin as tolerated.  q6week TSH check  q3mo 25-OHD Vitamin D Level, Goal 50, supplement as tolerated

## 2018-01-02 NOTE — PHYSICAL THERAPY INITIAL EVALUATION ADULT - CRITERIA FOR SKILLED THERAPEUTIC INTERVENTIONS
risk reduction/prevention/therapy frequency/functional limitations in following categories/predicted duration of therapy intervention/impairments found/rehab potential/anticipated equipment needs at discharge/anticipated discharge recommendation

## 2018-01-02 NOTE — PROGRESS NOTE ADULT - SUBJECTIVE AND OBJECTIVE BOX
ORTHO NOTE    [x ] Pt seen/examined.  [ ] Pt without any complaints/in NAD.    [x ] Pt complains of: R hip pain      ROS: [ ] Fever  [ ] Chills  [ ] CP [ ] SOB [ ] Dysnea  [ ] Palpitations [ ] Cough [ ] N/V/C/D [ ] Paresthia [ ] Other     [ x] ROS  otherwise negative    .    PHYSICAL EXAM:    Vital Signs Last 24 Hrs  T(C): 36.5 (02 Jan 2018 03:40), Max: 37.3 (01 Jan 2018 22:05)  T(F): 97.7 (02 Jan 2018 03:40), Max: 99.2 (01 Jan 2018 22:05)  HR: 73 (02 Jan 2018 03:40) (53 - 77)  BP: 113/60 (02 Jan 2018 03:40) (108/41 - 164/53)  BP(mean): 67 (01 Jan 2018 23:30) (57 - 84)  RR: 20 (02 Jan 2018 03:40) (6 - 21)  SpO2: 96% (02 Jan 2018 03:40) (88% - 100%)    I&O's Detail    01 Jan 2018 07:01  -  02 Jan 2018 07:00  --------------------------------------------------------  IN:    IV PiggyBack: 50 mL    lactated ringers.: 595 mL    Oral Fluid: 100 mL  Total IN: 745 mL    OUT:    Indwelling Catheter - Urethral: 1830 mL  Total OUT: 1830 mL    Total NET: -1085 mL           CAPILLARY BLOOD GLUCOSE      POCT Blood Glucose.: 115 mg/dL (02 Jan 2018 06:43)  POCT Blood Glucose.: 200 mg/dL (01 Jan 2018 22:39)  POCT Blood Glucose.: 107 mg/dL (01 Jan 2018 16:50)  POCT Blood Glucose.: 118 mg/dL (01 Jan 2018 11:06)                  Omani speaking pt, refusing  line, preferred daughter at bedside to assist in translation. Pt verbalized understanding of instructions  Neuro: AAOx3    Lungs: CTA, IS demonstrated     CV:    ABD: soft, non tender    Ext: R anterior hip with prevena to suction, ice applied to R hip, RLE NVID, motor 5/5, SCD to LLE     LABS                        10.7   9.5   )-----------( 206      ( 02 Jan 2018 07:52 )             33.2                              PT/INR - ( 31 Dec 2017 12:24 )   PT: 11.2 sec;   INR: 1.01          PTT - ( 31 Dec 2017 12:24 )  PTT:28.3 sec  01-02    138  |  99  |  11  ----------------------------<  122<H>  4.0   |  28  |  0.83    Ca    8.5      02 Jan 2018 07:52    TPro  7.6  /  Alb  4.4  /  TBili  0.4  /  DBili  x   /  AST  20  /  ALT  15  /  AlkPhos  100  12-31      [ ] Other Labs  [ ] None ordered            Please check or [place] when present:  •  Heart Failure:    [ ] Acute        [ ]  Acute on Chronic        [ ] Chronic         [ ] Diastolic     [ ]  Combined    •  BRYAN:     [ ] ATN        [ ]  Renal medullary necrosis       [ ]  Renal cortical necrosis                  [ ] Other pathological Lesion:  •  CKD:  [ ] Stage I   [ ] Stage II  [ ] Stage III    [ ]Stage IV   [ ]  CKD V   [ ]  Other/Unspecified:    •  Abdominal Nutritional Status:   [ ] Malnutrition-See Nutrition note    [ ] Cachexia   [ ]  Other        [ ] Supplement ordered:            [ ] Morbid Obesity: BMI >=40         ASSESSMENT/PLAN:      STATUS POST: R JOHNNIE anterior approach, POD#1  pain regimen adjusted with use of more non narcotics   remove izaguirre catheter after first PT session  encourage use of incentive spirometry, PO intake    CONTINUE:          [ ] PT- WBAT with rolling walker    [ ] DVT PPX- ASA 325mg PO    [ ] Pain Mgt- increase use of non narcotics    [ ] Dispo plan- PT eval; may need rehab

## 2018-01-02 NOTE — DISCHARGE NOTE ADULT - HOSPITAL COURSE
Admit 12/31/17  OR 1/1/18 s/p R JOHNNIE  Periop Antibx  DVT ppx  PT   Pain mgt  Cardiology c/s  Hypertension  Hyperlipidemia

## 2018-01-02 NOTE — PROGRESS NOTE ADULT - SUBJECTIVE AND OBJECTIVE BOX
MEDICINE CONSULT PROGRESS NOTE    INTERVAL EVENTS: s/p R JOHNNIE via anterolateral approach, POD #1    SUBJECTIVE:      REVIEW OF SYSTEMS:  neg except above    Vital Signs Last 24 Hrs  T(C): 36.5 (2018 03:40), Max: 37.3 (2018 22:05)  T(F): 97.7 (2018 03:40), Max: 99.2 (2018 22:05)  HR: 73 (2018 03:40) (53 - 77)  BP: 113/60 (2018 03:40) (108/41 - 164/53)  BP(mean): 67 (2018 23:30) (57 - 84)  RR: 20 (2018 03:40) (6 - 21)  SpO2: 96% (2018 03:40) (88% - 100%)    Height (cm): 160.02 ( @ 23:14)  Weight (kg): 72.6 ( @ 23:14)  BMI (kg/m2): 28.4 ( @ 23:14)    CAPILLARY BLOOD GLUCOSE      POCT Blood Glucose.: 115 mg/dL (2018 06:43)  POCT Blood Glucose.: 200 mg/dL (2018 22:39)  POCT Blood Glucose.: 107 mg/dL (2018 16:50)  POCT Blood Glucose.: 118 mg/dL (2018 11:06)      PHYSICAL EXAM:  Gen:       Well-appearing, NAD  Skin:       Warm, dry, no rash  HEENT:  MMM, oropharynx clear, nares clear, no septal deviation  Neck:     supple, no JVD  Cardiac: RRR, S1/S2 present, no murmur/gallop/rub  Pulm:     CTABL, no wheezes/crackles  Abd:       S/NT/ND, normoactive BS  Lymph:  no anterior/posterior/supraclavicular nodes appreciated  Vasc:      WWP, 2+ DP and radial pulses, no pedal edema  Ext:         Normal ROM, atraumatic  Neuro:   AAOx3, CN II-XII grossly intact, no focal deficits    MEDICATIONS  (STANDING):  aspirin enteric coated 325 milliGRAM(s) Oral two times a day  BUpivacaine liposome 1.3% Injectable (no eMAR) 20 milliLiter(s) Local Injection once  ceFAZolin   IVPB 2000 milliGRAM(s) IV Intermittent every 8 hours  dextrose 5%. 1000 milliLiter(s) (50 mL/Hr) IV Continuous <Continuous>  dextrose 50% Injectable 12.5 Gram(s) IV Push once  dextrose 50% Injectable 25 Gram(s) IV Push once  dextrose 50% Injectable 25 Gram(s) IV Push once  docusate sodium 100 milliGRAM(s) Oral three times a day  famotidine    Tablet 20 milliGRAM(s) Oral every 12 hours  lactated ringers. 1000 milliLiter(s) (70 mL/Hr) IV Continuous <Continuous>  polyethylene glycol 3350 17 Gram(s) Oral daily    MEDICATIONS  (PRN):  acetaminophen   Tablet 650 milliGRAM(s) Oral every 6 hours PRN For Temp greater than 38 C (100.4 F)  acetaminophen   Tablet. 650 milliGRAM(s) Oral every 6 hours PRN Mild Pain (1 - 3)  aluminum hydroxide/magnesium hydroxide/simethicone Suspension 30 milliLiter(s) Oral four times a day PRN Indigestion  bisacodyl Suppository 10 milliGRAM(s) Rectal daily PRN If no bowel movement by POD#2  dextrose Gel 1 Dose(s) Oral once PRN Blood Glucose LESS THAN 70 milliGRAM(s)/deciliter  glucagon  Injectable 1 milliGRAM(s) IntraMuscular once PRN Glucose LESS THAN 70 milligrams/deciliter  HYDROmorphone  Injectable 0.5 milliGRAM(s) IV Push every 2 hours PRN Breakthrough pain  HYDROmorphone  Injectable 0.5 milliGRAM(s) IV Push every 10 minutes PRN PACU breakthrough  metoclopramide Injectable 10 milliGRAM(s) IV Push every 6 hours PRN Nausea and/or Vomiting  ondansetron Injectable 4 milliGRAM(s) IV Push every 6 hours PRN Nausea and/or Vomiting  oxyCODONE    IR 10 milliGRAM(s) Oral every 4 hours PRN Severe Pain (7 - 10)  oxyCODONE    IR 5 milliGRAM(s) Oral every 4 hours PRN Moderate Pain (4 - 6)  senna 2 Tablet(s) Oral at bedtime PRN Constipation  zaleplon 5 milliGRAM(s) Oral at bedtime PRN Insomnia      Allergies    No Known Drug Allergies  Seafood (Rash)    Intolerances      LABS:                        10.7   9.5   )-----------( 206      ( 2018 07:52 )             33.2    Mean Cell Volume: 92.0 fL ( @ 07:52)        138  |  99  |  11  ----------------------------<  122<H>  4.0   |  28  |  0.83    Ca    8.5      2018 07:52    TPro  7.6  /  Alb  4.4  /  TBili  0.4  /  DBili  x   /  AST  20  /  ALT  15  /  AlkPhos  100  12-31    PT/INR - ( 31 Dec 2017 12:24 )   PT: 11.2 sec;   INR: 1.01          PTT - ( 31 Dec 2017 12:24 )  PTT:28.3 sec  Urinalysis Basic - ( 31 Dec 2017 15:01 )    Color: Yellow / Appearance: Clear / S.015 / pH: x  Gluc: x / Ketone: NEGATIVE  / Bili: Negative / Urobili: 0.2 E.U./dL   Blood: x / Protein: NEGATIVE mg/dL / Nitrite: NEGATIVE   Leuk Esterase: NEGATIVE / RBC: x / WBC x   Sq Epi: x / Non Sq Epi: x / Bacteria: x        MICROBIOLOGY:      RADIOLOGY: MEDICINE CONSULT PROGRESS NOTE    INTERVAL EVENTS: s/p R JOHNNIE via anterolateral approach, POD #1    SUBJECTIVE: Pt states her pain is 8/10 in R hip as she just stood up with PT. Pt denies fever, chills, nausea, vomiting. Passing gas. No BM.       REVIEW OF SYSTEMS:  neg except above    Vital Signs Last 24 Hrs  T(C): 36.5 (2018 03:40), Max: 37.3 (2018 22:05)  T(F): 97.7 (2018 03:40), Max: 99.2 (2018 22:05)  HR: 73 (2018 03:40) (53 - 77)  BP: 113/60 (2018 03:40) (108/41 - 164/53)  BP(mean): 67 (2018 23:30) (57 - 84)  RR: 20 (2018 03:40) (6 - 21)  SpO2: 96% (2018 03:40) (88% - 100%)    Height (cm): 160.02 ( @ 23:14)  Weight (kg): 72.6 ( @ 23:14)  BMI (kg/m2): 28.4 ( @ 23:14)    CAPILLARY BLOOD GLUCOSE      POCT Blood Glucose.: 115 mg/dL (2018 06:43)  POCT Blood Glucose.: 200 mg/dL (2018 22:39)  POCT Blood Glucose.: 107 mg/dL (2018 16:50)  POCT Blood Glucose.: 118 mg/dL (2018 11:06)      PHYSICAL EXAM:  Gen:       Well-appearing, NAD  Skin:       Warm, dry, no rash  HEENT:  MMM, oropharynx clear, nares clear, no septal deviation  Neck:     supple, no JVD  Cardiac: RRR, S1/S2 present, no murmur/gallop/rub  Pulm:     CTABL, no wheezes/crackles  Abd:       S/NT/ND, normoactive BS  Lymph:  no anterior/posterior/supraclavicular nodes appreciated  Vasc:      WWP, 2+ DP and radial pulses, no pedal edema  Ext:        L hip wound dressing c/d/i. no LE edema. SCD's in place  Neuro:   AAOx3, CN II-XII grossly intact, no focal deficits    MEDICATIONS  (STANDING):  aspirin enteric coated 325 milliGRAM(s) Oral two times a day  BUpivacaine liposome 1.3% Injectable (no eMAR) 20 milliLiter(s) Local Injection once  ceFAZolin   IVPB 2000 milliGRAM(s) IV Intermittent every 8 hours  dextrose 5%. 1000 milliLiter(s) (50 mL/Hr) IV Continuous <Continuous>  dextrose 50% Injectable 12.5 Gram(s) IV Push once  dextrose 50% Injectable 25 Gram(s) IV Push once  dextrose 50% Injectable 25 Gram(s) IV Push once  docusate sodium 100 milliGRAM(s) Oral three times a day  famotidine    Tablet 20 milliGRAM(s) Oral every 12 hours  lactated ringers. 1000 milliLiter(s) (70 mL/Hr) IV Continuous <Continuous>  polyethylene glycol 3350 17 Gram(s) Oral daily    MEDICATIONS  (PRN):  acetaminophen   Tablet 650 milliGRAM(s) Oral every 6 hours PRN For Temp greater than 38 C (100.4 F)  acetaminophen   Tablet. 650 milliGRAM(s) Oral every 6 hours PRN Mild Pain (1 - 3)  aluminum hydroxide/magnesium hydroxide/simethicone Suspension 30 milliLiter(s) Oral four times a day PRN Indigestion  bisacodyl Suppository 10 milliGRAM(s) Rectal daily PRN If no bowel movement by POD#2  dextrose Gel 1 Dose(s) Oral once PRN Blood Glucose LESS THAN 70 milliGRAM(s)/deciliter  glucagon  Injectable 1 milliGRAM(s) IntraMuscular once PRN Glucose LESS THAN 70 milligrams/deciliter  HYDROmorphone  Injectable 0.5 milliGRAM(s) IV Push every 2 hours PRN Breakthrough pain  HYDROmorphone  Injectable 0.5 milliGRAM(s) IV Push every 10 minutes PRN PACU breakthrough  metoclopramide Injectable 10 milliGRAM(s) IV Push every 6 hours PRN Nausea and/or Vomiting  ondansetron Injectable 4 milliGRAM(s) IV Push every 6 hours PRN Nausea and/or Vomiting  oxyCODONE    IR 10 milliGRAM(s) Oral every 4 hours PRN Severe Pain (7 - 10)  oxyCODONE    IR 5 milliGRAM(s) Oral every 4 hours PRN Moderate Pain (4 - 6)  senna 2 Tablet(s) Oral at bedtime PRN Constipation  zaleplon 5 milliGRAM(s) Oral at bedtime PRN Insomnia      Allergies    No Known Drug Allergies  Seafood (Rash)    Intolerances      LABS:                        10.7   9.5   )-----------( 206      ( 2018 07:52 )             33.2    Mean Cell Volume: 92.0 fL ( @ 07:52)        138  |  99  |  11  ----------------------------<  122<H>  4.0   |  28  |  0.83    Ca    8.5      2018 07:52    TPro  7.6  /  Alb  4.4  /  TBili  0.4  /  DBili  x   /  AST  20  /  ALT  15  /  AlkPhos  100  12-    PT/INR - ( 31 Dec 2017 12:24 )   PT: 11.2 sec;   INR: 1.01          PTT - ( 31 Dec 2017 12:24 )  PTT:28.3 sec  Urinalysis Basic - ( 31 Dec 2017 15:01 )    Color: Yellow / Appearance: Clear / S.015 / pH: x  Gluc: x / Ketone: NEGATIVE  / Bili: Negative / Urobili: 0.2 E.U./dL   Blood: x / Protein: NEGATIVE mg/dL / Nitrite: NEGATIVE   Leuk Esterase: NEGATIVE / RBC: x / WBC x   Sq Epi: x / Non Sq Epi: x / Bacteria: x        MICROBIOLOGY:      RADIOLOGY:

## 2018-01-02 NOTE — PHYSICAL THERAPY INITIAL EVALUATION ADULT - PERTINENT HX OF CURRENT PROBLEM, REHAB EVAL
Patient is 68 y/o F s/p mechanical fall in the home resulting in subcapital fracture right femur. Patient s/p for elective (R) JOHNNIE.

## 2018-01-02 NOTE — PHYSICAL THERAPY INITIAL EVALUATION ADULT - RANGE OF MOTION EXAMINATION, REHAB EVAL
unable to assess (R) LE due to patient pain from recent procedure./deficits as listed below/bilateral upper extremity ROM was WNL (within normal limits)/Left LE ROM was WNL (within normal limits)

## 2018-01-03 VITALS
SYSTOLIC BLOOD PRESSURE: 108 MMHG | OXYGEN SATURATION: 95 % | RESPIRATION RATE: 18 BRPM | HEART RATE: 68 BPM | DIASTOLIC BLOOD PRESSURE: 57 MMHG | TEMPERATURE: 97 F

## 2018-01-03 LAB
GLUCOSE BLDC GLUCOMTR-MCNC: 114 MG/DL — HIGH (ref 70–99)
GLUCOSE BLDC GLUCOMTR-MCNC: 146 MG/DL — HIGH (ref 70–99)

## 2018-01-03 PROCEDURE — 99233 SBSQ HOSP IP/OBS HIGH 50: CPT | Mod: GC

## 2018-01-03 PROCEDURE — 85730 THROMBOPLASTIN TIME PARTIAL: CPT

## 2018-01-03 PROCEDURE — 93306 TTE W/DOPPLER COMPLETE: CPT

## 2018-01-03 PROCEDURE — 88305 TISSUE EXAM BY PATHOLOGIST: CPT

## 2018-01-03 PROCEDURE — 85027 COMPLETE CBC AUTOMATED: CPT

## 2018-01-03 PROCEDURE — 86900 BLOOD TYPING SEROLOGIC ABO: CPT

## 2018-01-03 PROCEDURE — 73502 X-RAY EXAM HIP UNI 2-3 VIEWS: CPT

## 2018-01-03 PROCEDURE — 82962 GLUCOSE BLOOD TEST: CPT

## 2018-01-03 PROCEDURE — 86901 BLOOD TYPING SEROLOGIC RH(D): CPT

## 2018-01-03 PROCEDURE — C1776: CPT

## 2018-01-03 PROCEDURE — 93005 ELECTROCARDIOGRAM TRACING: CPT

## 2018-01-03 PROCEDURE — 71045 X-RAY EXAM CHEST 1 VIEW: CPT

## 2018-01-03 PROCEDURE — 83036 HEMOGLOBIN GLYCOSYLATED A1C: CPT

## 2018-01-03 PROCEDURE — 80053 COMPREHEN METABOLIC PANEL: CPT

## 2018-01-03 PROCEDURE — 76001: CPT

## 2018-01-03 PROCEDURE — C1889: CPT

## 2018-01-03 PROCEDURE — 36415 COLL VENOUS BLD VENIPUNCTURE: CPT

## 2018-01-03 PROCEDURE — 97162 PT EVAL MOD COMPLEX 30 MIN: CPT

## 2018-01-03 PROCEDURE — 80048 BASIC METABOLIC PNL TOTAL CA: CPT

## 2018-01-03 PROCEDURE — 88311 DECALCIFY TISSUE: CPT

## 2018-01-03 PROCEDURE — 82550 ASSAY OF CK (CPK): CPT

## 2018-01-03 PROCEDURE — 85025 COMPLETE CBC W/AUTO DIFF WBC: CPT

## 2018-01-03 PROCEDURE — 99285 EMERGENCY DEPT VISIT HI MDM: CPT | Mod: 25

## 2018-01-03 PROCEDURE — C1713: CPT

## 2018-01-03 PROCEDURE — 81025 URINE PREGNANCY TEST: CPT

## 2018-01-03 PROCEDURE — 96374 THER/PROPH/DIAG INJ IV PUSH: CPT

## 2018-01-03 PROCEDURE — 84484 ASSAY OF TROPONIN QUANT: CPT

## 2018-01-03 PROCEDURE — 81003 URINALYSIS AUTO W/O SCOPE: CPT

## 2018-01-03 PROCEDURE — 82553 CREATINE MB FRACTION: CPT

## 2018-01-03 PROCEDURE — 87641 MR-STAPH DNA AMP PROBE: CPT

## 2018-01-03 PROCEDURE — 86850 RBC ANTIBODY SCREEN: CPT

## 2018-01-03 PROCEDURE — 85610 PROTHROMBIN TIME: CPT

## 2018-01-03 PROCEDURE — 73560 X-RAY EXAM OF KNEE 1 OR 2: CPT

## 2018-01-03 RX ORDER — OXYCODONE HYDROCHLORIDE 5 MG/1
1 TABLET ORAL
Qty: 0 | Refills: 0 | COMMUNITY
Start: 2018-01-03

## 2018-01-03 RX ORDER — ASPIRIN/CALCIUM CARB/MAGNESIUM 324 MG
1 TABLET ORAL
Qty: 0 | Refills: 0 | COMMUNITY
Start: 2018-01-03

## 2018-01-03 RX ORDER — ASPIRIN/CALCIUM CARB/MAGNESIUM 324 MG
0 TABLET ORAL
Qty: 0 | Refills: 0 | COMMUNITY

## 2018-01-03 RX ORDER — INSULIN LISPRO 100/ML
0 VIAL (ML) SUBCUTANEOUS
Qty: 0 | Refills: 0 | COMMUNITY
Start: 2018-01-03

## 2018-01-03 RX ORDER — ACETAMINOPHEN 500 MG
3 TABLET ORAL
Qty: 0 | Refills: 0 | COMMUNITY
Start: 2018-01-03

## 2018-01-03 RX ADMIN — Medication 975 MILLIGRAM(S): at 13:45

## 2018-01-03 RX ADMIN — Medication 975 MILLIGRAM(S): at 05:31

## 2018-01-03 RX ADMIN — Medication 15 MILLIGRAM(S): at 12:18

## 2018-01-03 RX ADMIN — Medication 15 MILLIGRAM(S): at 06:04

## 2018-01-03 RX ADMIN — Medication 100 MILLIGRAM(S): at 05:31

## 2018-01-03 RX ADMIN — POLYETHYLENE GLYCOL 3350 17 GRAM(S): 17 POWDER, FOR SOLUTION ORAL at 13:06

## 2018-01-03 RX ADMIN — Medication 15 MILLIGRAM(S): at 05:30

## 2018-01-03 RX ADMIN — FAMOTIDINE 20 MILLIGRAM(S): 10 INJECTION INTRAVENOUS at 05:31

## 2018-01-03 RX ADMIN — Medication 100 MILLIGRAM(S): at 13:44

## 2018-01-03 RX ADMIN — Medication 975 MILLIGRAM(S): at 14:15

## 2018-01-03 RX ADMIN — Medication 325 MILLIGRAM(S): at 05:31

## 2018-01-03 RX ADMIN — Medication 975 MILLIGRAM(S): at 06:04

## 2018-01-03 RX ADMIN — Medication 15 MILLIGRAM(S): at 12:48

## 2018-01-03 NOTE — PROGRESS NOTE ADULT - ASSESSMENT
66 yo F with h/o thoracic aortic aneurysm repair, h/o kidney stones, DMT2 (on metformin), presented for severe R hip pain 2/2 fall, now s/p R JOHNNIE, medicine consulted for pre-op optimization found to have new LBBB (cardiology consulted), now awaiting JUJU.    # Hypotension - pt's SBP 80-90s  - agree with starting IVF  - encourage increased PO intake   - would be judicious with oxycodone for pain as this can lower pt's BP's further  - use tylenol or ketorolac for pain     # R hip fem neck fracture s/p R JOHNNIE POD #2  - pain control per ortho; would recommend avoiding oxycodone 2/2 low BP's (has not required any oxycodone in last 24 hrs)  - continue bowel regimen ppx    # DM T2 (on metformin at home)  - continue to hold metformin   - continue with medium sliding scale     # New LBBB  - cardiology following    # PPx  - c/w SCD's bilaterally, c/w ASA 325mg BID for DVT ppx    # Dispo  - PT recommended JUJU 66 yo F with h/o thoracic aortic aneurysm repair, h/o kidney stones, DMT2 (on metformin), presented for severe R hip pain 2/2 fall, now s/p R JOHNNIE, medicine consulted for pre-op optimization found to have new LBBB (cardiology consulted), now awaiting JUJU.    # Hypotension - pt's SBP 90-100s  - would DC IVF or decrease rate as pt eating well   - encourage increased PO intake   - would be judicious with oxycodone for pain as this can lower pt's BP's further  - use tylenol or ketorolac for pain     # R hip fem neck fracture s/p R JOHNNIE POD #2  - pain control per ortho; would recommend avoiding oxycodone 2/2 low BP's (has not required any oxycodone in last 24 hrs)  - continue bowel regimen ppx    # DM T2 (on metformin at home)  - continue to hold metformin   - continue with medium sliding scale     # New LBBB  - cardiology following  - will f/u with cardiology as outpt     # PPx  - c/w SCD's bilaterally, c/w ASA 325mg BID for DVT ppx  - encourage IS compliance (demonstrated to pt how to use IS correctly)     # Dispo  - PT recommended JUJU

## 2018-01-03 NOTE — PROGRESS NOTE ADULT - SUBJECTIVE AND OBJECTIVE BOX
S: Patient seen and examined. Doing well this AM. Pain reported but controlled. No acute events overnight. Worked with PT without incident yesterday.    O: AFVSS  Motor: intact GS/TA/EHL/FHL BL   SILT to patients baseline BL  WWP DP PT BL  Dressing C/D/I --> Prevena

## 2018-01-03 NOTE — PROGRESS NOTE ADULT - ATTENDING COMMENTS
Seen and examined by me this morning. Doing well, noted BP on low side, recommend to minimize opiate use and encourage increased PO intake. Discussed with patient and family in [place] the importance of having cardiology follow up as outpatient for stress testing. Rest as above.
Seen and examined by me this morning. Had episode of dizziness/lightheadedness with hypotension when pt stood up while the aide was making her bed, f/up BP closely after fluid bolus and minimize opiate use as they could cause orthostatic hypotension, encouraged increased PO intake. C/w bowel regimen, DVT PPx and enc IS use. Needs outpatient follow up with Cardiology for stress test given TTE findings and LBBB. F/up anemia. Rest as above.

## 2018-01-03 NOTE — PROGRESS NOTE ADULT - SUBJECTIVE AND OBJECTIVE BOX
MEDICINE CONSULT PROGRESS NOTE    INTERVAL EVENTS: s/p R JOHNNIE via anterolateral approach, POD #2    SUBJECTIVE:       REVIEW OF SYSTEMS:  neg except above    Vital Signs Last 24 Hrs  T(C): 36.8 (03 Jan 2018 04:50), Max: 37.9 (03 Jan 2018 00:53)  T(F): 98.2 (03 Jan 2018 04:50), Max: 100.2 (03 Jan 2018 00:53)  HR: 70 (03 Jan 2018 04:50) (62 - 75)  BP: 92/53 (03 Jan 2018 05:26) (87/44 - 99/44)  BP(mean): --  RR: 16 (03 Jan 2018 04:50) (16 - 17)  SpO2: 93% (03 Jan 2018 04:50) (93% - 97%)    CAPILLARY BLOOD GLUCOSE      POCT Blood Glucose.: 114 mg/dL (03 Jan 2018 06:51)  POCT Blood Glucose.: 173 mg/dL (02 Jan 2018 21:24)  POCT Blood Glucose.: 185 mg/dL (02 Jan 2018 17:02)  POCT Blood Glucose.: 146 mg/dL (02 Jan 2018 12:21)      PHYSICAL EXAM:  Gen:       Well-appearing, NAD  Skin:       Warm, dry, no rash  HEENT:  MMM, oropharynx clear, nares clear, no septal deviation  Neck:     supple, no JVD  Cardiac: RRR, S1/S2 present, no murmur/gallop/rub  Pulm:     CTABL, no wheezes/crackles  Abd:       S/NT/ND, normoactive BS  Lymph:  no anterior/posterior/supraclavicular nodes appreciated  Vasc:      WWP, 2+ DP and radial pulses, no pedal edema  Ext:        L hip wound dressing c/d/i. no LE edema. SCD's in place  Neuro:   AAOx3, CN II-XII grossly intact, no focal deficits    MEDICATIONS  (STANDING):  acetaminophen   Tablet. 975 milliGRAM(s) Oral every 8 hours  acetaminophen  IVPB. 1000 milliGRAM(s) IV Intermittent once  aspirin enteric coated 325 milliGRAM(s) Oral two times a day  BUpivacaine liposome 1.3% Injectable (no eMAR) 20 milliLiter(s) Local Injection once  dextrose 5%. 1000 milliLiter(s) (50 mL/Hr) IV Continuous <Continuous>  dextrose 5%. 1000 milliLiter(s) (50 mL/Hr) IV Continuous <Continuous>  dextrose 50% Injectable 12.5 Gram(s) IV Push once  dextrose 50% Injectable 25 Gram(s) IV Push once  dextrose 50% Injectable 25 Gram(s) IV Push once  dextrose 50% Injectable 12.5 Gram(s) IV Push once  dextrose 50% Injectable 25 Gram(s) IV Push once  dextrose 50% Injectable 25 Gram(s) IV Push once  docusate sodium 100 milliGRAM(s) Oral three times a day  famotidine    Tablet 20 milliGRAM(s) Oral every 12 hours  insulin lispro (HumaLOG) corrective regimen sliding scale   SubCutaneous Before meals and at bedtime  ketorolac   Injectable 15 milliGRAM(s) IV Push every 6 hours  polyethylene glycol 3350 17 Gram(s) Oral daily  sodium chloride 0.9%. 1000 milliLiter(s) (120 mL/Hr) IV Continuous <Continuous>    MEDICATIONS  (PRN):  acetaminophen   Tablet 650 milliGRAM(s) Oral every 6 hours PRN For Temp greater than 38 C (100.4 F)  aluminum hydroxide/magnesium hydroxide/simethicone Suspension 30 milliLiter(s) Oral four times a day PRN Indigestion  bisacodyl Suppository 10 milliGRAM(s) Rectal daily PRN If no bowel movement by POD#2  dextrose Gel 1 Dose(s) Oral once PRN Blood Glucose LESS THAN 70 milliGRAM(s)/deciliter  dextrose Gel 1 Dose(s) Oral once PRN Blood Glucose LESS THAN 70 milliGRAM(s)/deciliter  glucagon  Injectable 1 milliGRAM(s) IntraMuscular once PRN Glucose LESS THAN 70 milligrams/deciliter  glucagon  Injectable 1 milliGRAM(s) IntraMuscular once PRN Glucose LESS THAN 70 milligrams/deciliter  metoclopramide Injectable 10 milliGRAM(s) IV Push every 6 hours PRN Nausea and/or Vomiting  ondansetron Injectable 4 milliGRAM(s) IV Push every 6 hours PRN Nausea and/or Vomiting  oxyCODONE    IR 10 milliGRAM(s) Oral every 4 hours PRN Severe Pain (7 - 10)  oxyCODONE    IR 5 milliGRAM(s) Oral every 4 hours PRN Moderate Pain (4 - 6)  senna 2 Tablet(s) Oral at bedtime PRN Constipation  zaleplon 5 milliGRAM(s) Oral at bedtime PRN Insomnia      Allergies    No Known Drug Allergies  Seafood (Rash)    Intolerances      LABS:             no labs this AM, 10am pending MEDICINE CONSULT PROGRESS NOTE    INTERVAL EVENTS: s/p R JOHNNIE via anterolateral approach, POD #2    SUBJECTIVE: Pt states her pain is better controlled. Denies lightheadedness, diarrhea, nausea, vomiting, fever, chills. No BM. Has been passing gas.       REVIEW OF SYSTEMS:  neg except above    Vital Signs Last 24 Hrs  T(C): 36.8 (03 Jan 2018 04:50), Max: 37.9 (03 Jan 2018 00:53)  T(F): 98.2 (03 Jan 2018 04:50), Max: 100.2 (03 Jan 2018 00:53)  HR: 70 (03 Jan 2018 04:50) (62 - 75)  BP: 92/53 (03 Jan 2018 05:26) (87/44 - 99/44)  BP(mean): --  RR: 16 (03 Jan 2018 04:50) (16 - 17)  SpO2: 93% (03 Jan 2018 04:50) (93% - 97%)    CAPILLARY BLOOD GLUCOSE      POCT Blood Glucose.: 114 mg/dL (03 Jan 2018 06:51)  POCT Blood Glucose.: 173 mg/dL (02 Jan 2018 21:24)  POCT Blood Glucose.: 185 mg/dL (02 Jan 2018 17:02)  POCT Blood Glucose.: 146 mg/dL (02 Jan 2018 12:21)      PHYSICAL EXAM:  Gen:       Well-appearing, NAD  Skin:       Warm, dry, no rash  HEENT:  MMM, oropharynx clear, nares clear, no septal deviation  Neck:     supple, no JVD  Cardiac: RRR, S1/S2 present, no murmur/gallop/rub  Pulm:     CTABL, no wheezes/crackles  Abd:       S/NT/ND, normoactive BS  Lymph:  no anterior/posterior/supraclavicular nodes appreciated  Vasc:      WWP, 2+ DP and radial pulses, no pedal edema  Ext:        L hip wound dressing c/d/i. no LE edema. SCD's in place  Neuro:   AAOx3, CN II-XII grossly intact, no focal deficits    MEDICATIONS  (STANDING):  acetaminophen   Tablet. 975 milliGRAM(s) Oral every 8 hours  acetaminophen  IVPB. 1000 milliGRAM(s) IV Intermittent once  aspirin enteric coated 325 milliGRAM(s) Oral two times a day  BUpivacaine liposome 1.3% Injectable (no eMAR) 20 milliLiter(s) Local Injection once  dextrose 5%. 1000 milliLiter(s) (50 mL/Hr) IV Continuous <Continuous>  dextrose 5%. 1000 milliLiter(s) (50 mL/Hr) IV Continuous <Continuous>  dextrose 50% Injectable 12.5 Gram(s) IV Push once  dextrose 50% Injectable 25 Gram(s) IV Push once  dextrose 50% Injectable 25 Gram(s) IV Push once  dextrose 50% Injectable 12.5 Gram(s) IV Push once  dextrose 50% Injectable 25 Gram(s) IV Push once  dextrose 50% Injectable 25 Gram(s) IV Push once  docusate sodium 100 milliGRAM(s) Oral three times a day  famotidine    Tablet 20 milliGRAM(s) Oral every 12 hours  insulin lispro (HumaLOG) corrective regimen sliding scale   SubCutaneous Before meals and at bedtime  ketorolac   Injectable 15 milliGRAM(s) IV Push every 6 hours  polyethylene glycol 3350 17 Gram(s) Oral daily  sodium chloride 0.9%. 1000 milliLiter(s) (120 mL/Hr) IV Continuous <Continuous>    MEDICATIONS  (PRN):  acetaminophen   Tablet 650 milliGRAM(s) Oral every 6 hours PRN For Temp greater than 38 C (100.4 F)  aluminum hydroxide/magnesium hydroxide/simethicone Suspension 30 milliLiter(s) Oral four times a day PRN Indigestion  bisacodyl Suppository 10 milliGRAM(s) Rectal daily PRN If no bowel movement by POD#2  dextrose Gel 1 Dose(s) Oral once PRN Blood Glucose LESS THAN 70 milliGRAM(s)/deciliter  dextrose Gel 1 Dose(s) Oral once PRN Blood Glucose LESS THAN 70 milliGRAM(s)/deciliter  glucagon  Injectable 1 milliGRAM(s) IntraMuscular once PRN Glucose LESS THAN 70 milligrams/deciliter  glucagon  Injectable 1 milliGRAM(s) IntraMuscular once PRN Glucose LESS THAN 70 milligrams/deciliter  metoclopramide Injectable 10 milliGRAM(s) IV Push every 6 hours PRN Nausea and/or Vomiting  ondansetron Injectable 4 milliGRAM(s) IV Push every 6 hours PRN Nausea and/or Vomiting  oxyCODONE    IR 10 milliGRAM(s) Oral every 4 hours PRN Severe Pain (7 - 10)  oxyCODONE    IR 5 milliGRAM(s) Oral every 4 hours PRN Moderate Pain (4 - 6)  senna 2 Tablet(s) Oral at bedtime PRN Constipation  zaleplon 5 milliGRAM(s) Oral at bedtime PRN Insomnia      Allergies    No Known Drug Allergies  Seafood (Rash)    Intolerances      LABS:             no labs this AM, 10am pending

## 2018-01-03 NOTE — PROGRESS NOTE ADULT - ASSESSMENT
A/P: 67 y s/p R JOHNNIE via anterolateral approach on 1/1/2018   -stable  -pain controlled  -cardiology recommendations  -PT/WBAT  -diet as tolerated  -f/u labs  -disposition: rehabilitation per PT

## 2018-01-05 DIAGNOSIS — Z91.013 ALLERGY TO SEAFOOD: ICD-10-CM

## 2018-01-05 DIAGNOSIS — S72.001A FRACTURE OF UNSPECIFIED PART OF NECK OF RIGHT FEMUR, INITIAL ENCOUNTER FOR CLOSED FRACTURE: ICD-10-CM

## 2018-01-05 DIAGNOSIS — E78.5 HYPERLIPIDEMIA, UNSPECIFIED: ICD-10-CM

## 2018-01-05 DIAGNOSIS — E11.9 TYPE 2 DIABETES MELLITUS WITHOUT COMPLICATIONS: ICD-10-CM

## 2018-01-05 DIAGNOSIS — N20.0 CALCULUS OF KIDNEY: ICD-10-CM

## 2018-01-05 DIAGNOSIS — K21.9 GASTRO-ESOPHAGEAL REFLUX DISEASE WITHOUT ESOPHAGITIS: ICD-10-CM

## 2018-01-05 DIAGNOSIS — M79.606 PAIN IN LEG, UNSPECIFIED: ICD-10-CM

## 2018-01-05 DIAGNOSIS — I44.7 LEFT BUNDLE-BRANCH BLOCK, UNSPECIFIED: ICD-10-CM

## 2018-01-05 LAB — SURGICAL PATHOLOGY STUDY: SIGNIFICANT CHANGE UP

## 2018-01-16 DIAGNOSIS — I95.89 OTHER HYPOTENSION: ICD-10-CM
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
## 2018-01-16 DIAGNOSIS — Z87.442 PERSONAL HISTORY OF URINARY CALCULI: ICD-10-CM
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
## 2018-05-24 PROBLEM — E78.5 HYPERLIPIDEMIA, UNSPECIFIED: Chronic | Status: ACTIVE | Noted: 2017-12-31

## 2018-05-24 PROBLEM — E11.9 TYPE 2 DIABETES MELLITUS WITHOUT COMPLICATIONS: Chronic | Status: ACTIVE | Noted: 2017-12-31

## 2018-05-25 VITALS
SYSTOLIC BLOOD PRESSURE: 130 MMHG | RESPIRATION RATE: 16 BRPM | HEIGHT: 63 IN | OXYGEN SATURATION: 97 % | TEMPERATURE: 98 F | HEART RATE: 74 BPM | WEIGHT: 167.55 LBS | DIASTOLIC BLOOD PRESSURE: 60 MMHG

## 2018-05-25 NOTE — ASU PATIENT PROFILE, ADULT - LANGUAGE ASSISTANCE NEEDED
patient opted to have daughter to translate/No-Patient/Caregiver offered and refused free interpretation services.

## 2018-05-29 ENCOUNTER — INPATIENT (INPATIENT)
Facility: HOSPITAL | Age: 68
LOS: 2 days | Discharge: HOME | DRG: 470 | End: 2018-06-01
Attending: ORTHOPAEDIC SURGERY | Admitting: ORTHOPAEDIC SURGERY
Payer: MEDICARE

## 2018-05-29 ENCOUNTER — RESULT REVIEW (OUTPATIENT)
Age: 68
End: 2018-05-29

## 2018-05-29 DIAGNOSIS — E11.9 TYPE 2 DIABETES MELLITUS WITHOUT COMPLICATIONS: ICD-10-CM

## 2018-05-29 DIAGNOSIS — Z96.641 PRESENCE OF RIGHT ARTIFICIAL HIP JOINT: Chronic | ICD-10-CM

## 2018-05-29 DIAGNOSIS — I51.9 HEART DISEASE, UNSPECIFIED: ICD-10-CM

## 2018-05-29 DIAGNOSIS — M17.12 UNILATERAL PRIMARY OSTEOARTHRITIS, LEFT KNEE: ICD-10-CM

## 2018-05-29 LAB
GLUCOSE BLDC GLUCOMTR-MCNC: 115 MG/DL — HIGH (ref 70–99)
GLUCOSE BLDC GLUCOMTR-MCNC: 118 MG/DL — HIGH (ref 70–99)
GLUCOSE BLDC GLUCOMTR-MCNC: 119 MG/DL — HIGH (ref 70–99)
GLUCOSE BLDC GLUCOMTR-MCNC: 166 MG/DL — HIGH (ref 70–99)
MRSA PCR RESULT.: NEGATIVE — SIGNIFICANT CHANGE UP
S AUREUS DNA NOSE QL NAA+PROBE: NEGATIVE — SIGNIFICANT CHANGE UP

## 2018-05-29 PROCEDURE — 73560 X-RAY EXAM OF KNEE 1 OR 2: CPT | Mod: 26,LT

## 2018-05-29 RX ORDER — SODIUM CHLORIDE 9 MG/ML
1000 INJECTION, SOLUTION INTRAVENOUS
Qty: 0 | Refills: 0 | Status: DISCONTINUED | OUTPATIENT
Start: 2018-05-29 | End: 2018-06-01

## 2018-05-29 RX ORDER — PANTOPRAZOLE SODIUM 20 MG/1
40 TABLET, DELAYED RELEASE ORAL DAILY
Qty: 0 | Refills: 0 | Status: DISCONTINUED | OUTPATIENT
Start: 2018-05-30 | End: 2018-06-01

## 2018-05-29 RX ORDER — BUPIVACAINE 13.3 MG/ML
20 INJECTION, SUSPENSION, LIPOSOMAL INFILTRATION ONCE
Qty: 0 | Refills: 0 | Status: DISCONTINUED | OUTPATIENT
Start: 2018-05-29 | End: 2018-06-01

## 2018-05-29 RX ORDER — DEXTROSE 50 % IN WATER 50 %
25 SYRINGE (ML) INTRAVENOUS ONCE
Qty: 0 | Refills: 0 | Status: DISCONTINUED | OUTPATIENT
Start: 2018-05-29 | End: 2018-06-01

## 2018-05-29 RX ORDER — POLYETHYLENE GLYCOL 3350 17 G/17G
17 POWDER, FOR SOLUTION ORAL DAILY
Qty: 0 | Refills: 0 | Status: DISCONTINUED | OUTPATIENT
Start: 2018-05-29 | End: 2018-06-01

## 2018-05-29 RX ORDER — DOCUSATE SODIUM 100 MG
100 CAPSULE ORAL THREE TIMES A DAY
Qty: 0 | Refills: 0 | Status: DISCONTINUED | OUTPATIENT
Start: 2018-05-29 | End: 2018-06-01

## 2018-05-29 RX ORDER — GLUCAGON INJECTION, SOLUTION 0.5 MG/.1ML
1 INJECTION, SOLUTION SUBCUTANEOUS ONCE
Qty: 0 | Refills: 0 | Status: DISCONTINUED | OUTPATIENT
Start: 2018-05-29 | End: 2018-06-01

## 2018-05-29 RX ORDER — ASPIRIN/CALCIUM CARB/MAGNESIUM 324 MG
325 TABLET ORAL
Qty: 0 | Refills: 0 | Status: DISCONTINUED | OUTPATIENT
Start: 2018-05-29 | End: 2018-06-01

## 2018-05-29 RX ORDER — SODIUM CHLORIDE 9 MG/ML
1000 INJECTION, SOLUTION INTRAVENOUS
Qty: 0 | Refills: 0 | Status: DISCONTINUED | OUTPATIENT
Start: 2018-05-29 | End: 2018-05-30

## 2018-05-29 RX ORDER — INSULIN LISPRO 100/ML
VIAL (ML) SUBCUTANEOUS
Qty: 0 | Refills: 0 | Status: DISCONTINUED | OUTPATIENT
Start: 2018-05-29 | End: 2018-06-01

## 2018-05-29 RX ORDER — ACETAMINOPHEN 500 MG
650 TABLET ORAL EVERY 6 HOURS
Qty: 0 | Refills: 0 | Status: DISCONTINUED | OUTPATIENT
Start: 2018-05-29 | End: 2018-06-01

## 2018-05-29 RX ORDER — ONDANSETRON 8 MG/1
4 TABLET, FILM COATED ORAL EVERY 6 HOURS
Qty: 0 | Refills: 0 | Status: DISCONTINUED | OUTPATIENT
Start: 2018-05-29 | End: 2018-06-01

## 2018-05-29 RX ORDER — CELECOXIB 200 MG/1
200 CAPSULE ORAL
Qty: 0 | Refills: 0 | Status: DISCONTINUED | OUTPATIENT
Start: 2018-05-31 | End: 2018-06-01

## 2018-05-29 RX ORDER — MAGNESIUM HYDROXIDE 400 MG/1
30 TABLET, CHEWABLE ORAL DAILY
Qty: 0 | Refills: 0 | Status: DISCONTINUED | OUTPATIENT
Start: 2018-05-29 | End: 2018-06-01

## 2018-05-29 RX ORDER — DEXTROSE 50 % IN WATER 50 %
12.5 SYRINGE (ML) INTRAVENOUS ONCE
Qty: 0 | Refills: 0 | Status: DISCONTINUED | OUTPATIENT
Start: 2018-05-29 | End: 2018-06-01

## 2018-05-29 RX ORDER — OXYCODONE HYDROCHLORIDE 5 MG/1
5 TABLET ORAL EVERY 4 HOURS
Qty: 0 | Refills: 0 | Status: DISCONTINUED | OUTPATIENT
Start: 2018-05-29 | End: 2018-06-01

## 2018-05-29 RX ORDER — KETOROLAC TROMETHAMINE 30 MG/ML
15 SYRINGE (ML) INJECTION EVERY 6 HOURS
Qty: 0 | Refills: 0 | Status: DISCONTINUED | OUTPATIENT
Start: 2018-05-29 | End: 2018-05-30

## 2018-05-29 RX ORDER — OXYCODONE HYDROCHLORIDE 5 MG/1
10 TABLET ORAL EVERY 12 HOURS
Qty: 0 | Refills: 0 | Status: DISCONTINUED | OUTPATIENT
Start: 2018-05-29 | End: 2018-06-01

## 2018-05-29 RX ORDER — CEFAZOLIN SODIUM 1 G
2000 VIAL (EA) INJECTION EVERY 8 HOURS
Qty: 0 | Refills: 0 | Status: COMPLETED | OUTPATIENT
Start: 2018-05-29 | End: 2018-05-30

## 2018-05-29 RX ORDER — DEXTROSE 50 % IN WATER 50 %
15 SYRINGE (ML) INTRAVENOUS ONCE
Qty: 0 | Refills: 0 | Status: DISCONTINUED | OUTPATIENT
Start: 2018-05-29 | End: 2018-06-01

## 2018-05-29 RX ORDER — SENNA PLUS 8.6 MG/1
2 TABLET ORAL AT BEDTIME
Qty: 0 | Refills: 0 | Status: DISCONTINUED | OUTPATIENT
Start: 2018-05-29 | End: 2018-05-30

## 2018-05-29 RX ORDER — OXYCODONE HYDROCHLORIDE 5 MG/1
10 TABLET ORAL ONCE
Qty: 0 | Refills: 0 | Status: DISCONTINUED | OUTPATIENT
Start: 2018-05-29 | End: 2018-05-29

## 2018-05-29 RX ORDER — HYDROMORPHONE HYDROCHLORIDE 2 MG/ML
0.5 INJECTION INTRAMUSCULAR; INTRAVENOUS; SUBCUTANEOUS
Qty: 0 | Refills: 0 | Status: DISCONTINUED | OUTPATIENT
Start: 2018-05-29 | End: 2018-06-01

## 2018-05-29 RX ORDER — FAMOTIDINE 10 MG/ML
20 INJECTION INTRAVENOUS EVERY 12 HOURS
Qty: 0 | Refills: 0 | Status: DISCONTINUED | OUTPATIENT
Start: 2018-05-29 | End: 2018-05-29

## 2018-05-29 RX ORDER — OXYCODONE HYDROCHLORIDE 5 MG/1
10 TABLET ORAL EVERY 4 HOURS
Qty: 0 | Refills: 0 | Status: DISCONTINUED | OUTPATIENT
Start: 2018-05-29 | End: 2018-06-01

## 2018-05-29 RX ORDER — CELECOXIB 200 MG/1
400 CAPSULE ORAL ONCE
Qty: 0 | Refills: 0 | Status: COMPLETED | OUTPATIENT
Start: 2018-05-29 | End: 2018-05-29

## 2018-05-29 RX ORDER — ATORVASTATIN CALCIUM 80 MG/1
10 TABLET, FILM COATED ORAL AT BEDTIME
Qty: 0 | Refills: 0 | Status: DISCONTINUED | OUTPATIENT
Start: 2018-05-29 | End: 2018-06-01

## 2018-05-29 RX ADMIN — Medication 100 MILLIGRAM(S): at 23:15

## 2018-05-29 RX ADMIN — HYDROMORPHONE HYDROCHLORIDE 0.5 MILLIGRAM(S): 2 INJECTION INTRAMUSCULAR; INTRAVENOUS; SUBCUTANEOUS at 20:00

## 2018-05-29 RX ADMIN — Medication 100 MILLIGRAM(S): at 23:20

## 2018-05-29 RX ADMIN — OXYCODONE HYDROCHLORIDE 10 MILLIGRAM(S): 5 TABLET ORAL at 14:03

## 2018-05-29 RX ADMIN — Medication 15 MILLIGRAM(S): at 23:20

## 2018-05-29 RX ADMIN — Medication 2: at 23:15

## 2018-05-29 RX ADMIN — OXYCODONE HYDROCHLORIDE 10 MILLIGRAM(S): 5 TABLET ORAL at 23:20

## 2018-05-29 RX ADMIN — ONDANSETRON 4 MILLIGRAM(S): 8 TABLET, FILM COATED ORAL at 23:46

## 2018-05-29 RX ADMIN — CELECOXIB 400 MILLIGRAM(S): 200 CAPSULE ORAL at 14:03

## 2018-05-29 RX ADMIN — Medication 15 MILLIGRAM(S): at 23:35

## 2018-05-29 RX ADMIN — HYDROMORPHONE HYDROCHLORIDE 0.5 MILLIGRAM(S): 2 INJECTION INTRAMUSCULAR; INTRAVENOUS; SUBCUTANEOUS at 20:15

## 2018-05-29 NOTE — H&P ADULT - PMH
Calculus of kidney  Nephrolithiasis  Cardiac disease  HX of open heart surgery ?AA repair  Diabetes    Hyperlipidemia

## 2018-05-29 NOTE — H&P ADULT - PSH
History of surgery to major organs, presenting hazards to health  History of nephrolithotomy with removal of calculi  History of surgery to major organs, presenting hazards to health  History of lithotripsy  History of total hip replacement, right    Other postprocedural status  S/P aortic aneurysm repair

## 2018-05-29 NOTE — PROGRESS NOTE ADULT - SUBJECTIVE AND OBJECTIVE BOX
Orthopaedics Post Op Check    Procedure: L TKA  Surgeon: Norma    Pt comfortable, without complaints  Denies CP, SOB, N/V, numbness/tingling     Vital Signs Last 24 Hrs  T(C): 36.6 (29 May 2018 16:25), Max: 36.6 (29 May 2018 16:25)  T(F): 97.8 (29 May 2018 16:25), Max: 97.8 (29 May 2018 16:25)  HR: 62 (29 May 2018 18:15) (62 - 68)  BP: 139/60 (29 May 2018 18:15) (121/57 - 139/60)  BP(mean): 87 (29 May 2018 18:15) (80 - 87)  RR: 16 (29 May 2018 18:15) (11 - 16)  SpO2: 100% (29 May 2018 18:15) (100% - 100%)  AVSS, NAD    Dressing C/D/I  General: Pt Alert and oriented     Pulses: WWP, DP/PT 2+  Sensation: SILT  Motor: 5/5 EHL/FHL/TA/GS          Post op XR: no fracture or foreign body    A/P: 67yFemale POD#0 s/p above procedure  - Stable  - Pain Control  - DVT ppx: ASA  - Post op abx: Ancef  - PT, WBS: WBAT   - F/U AM Labs    Marcus Sevilla MD, PGY-2  [phone]

## 2018-05-29 NOTE — H&P ADULT - NSHPPHYSICALEXAM_GEN_ALL_CORE
NAD  MSK:  limited ROM left knee secondary to pain  ehl, tib ant, GS 5/5 b/l  gross sensation to light touch intact b/l LE  DP 2+ b/l  calf soft, compressible b/l  +1 edema b/l LE    right hip surgical incision well healed    Rest of PE per medical clearance

## 2018-05-29 NOTE — H&P ADULT - HISTORY OF PRESENT ILLNESS
67 y.o F with chronic left knee pain. Has had conservative treatment but still symptomatic.    Presents for elective Left knee partial vs. total knee replacement.  Independent ambulator but uses assistive device. No numbness of LE. Admits to mild intermittent swelling of lower legs. No recent illness, fever , or chills. No other complaints.     Was admitted in 12/2017 with right hip fracture s/p fall. Surgically repaired with hip replacement. Reports no complications from hip surgery other than worsening left knee pain. Hx of "open heart surgery"  5 years ago for "bad vessel" per patient. On ASA 81mg, last dose yesterday.

## 2018-05-30 ENCOUNTER — TRANSCRIPTION ENCOUNTER (OUTPATIENT)
Age: 68
End: 2018-05-30

## 2018-05-30 LAB
ANION GAP SERPL CALC-SCNC: 12 MMOL/L — SIGNIFICANT CHANGE UP (ref 5–17)
BUN SERPL-MCNC: 12 MG/DL — SIGNIFICANT CHANGE UP (ref 7–23)
CALCIUM SERPL-MCNC: 8.7 MG/DL — SIGNIFICANT CHANGE UP (ref 8.4–10.5)
CHLORIDE SERPL-SCNC: 100 MMOL/L — SIGNIFICANT CHANGE UP (ref 96–108)
CO2 SERPL-SCNC: 28 MMOL/L — SIGNIFICANT CHANGE UP (ref 22–31)
CREAT SERPL-MCNC: 0.78 MG/DL — SIGNIFICANT CHANGE UP (ref 0.5–1.3)
GLUCOSE BLDC GLUCOMTR-MCNC: 134 MG/DL — HIGH (ref 70–99)
GLUCOSE BLDC GLUCOMTR-MCNC: 151 MG/DL — HIGH (ref 70–99)
GLUCOSE BLDC GLUCOMTR-MCNC: 154 MG/DL — HIGH (ref 70–99)
GLUCOSE BLDC GLUCOMTR-MCNC: 201 MG/DL — HIGH (ref 70–99)
GLUCOSE SERPL-MCNC: 133 MG/DL — HIGH (ref 70–99)
HBA1C BLD-MCNC: 6.5 % — HIGH (ref 4–5.6)
HCT VFR BLD CALC: 30.9 % — LOW (ref 34.5–45)
HGB BLD-MCNC: 9.6 G/DL — LOW (ref 11.5–15.5)
MCHC RBC-ENTMCNC: 27.4 PG — SIGNIFICANT CHANGE UP (ref 27–34)
MCHC RBC-ENTMCNC: 31.1 G/DL — LOW (ref 32–36)
MCV RBC AUTO: 88.3 FL — SIGNIFICANT CHANGE UP (ref 80–100)
PLATELET # BLD AUTO: 245 K/UL — SIGNIFICANT CHANGE UP (ref 150–400)
POTASSIUM SERPL-MCNC: 4.6 MMOL/L — SIGNIFICANT CHANGE UP (ref 3.5–5.3)
POTASSIUM SERPL-SCNC: 4.6 MMOL/L — SIGNIFICANT CHANGE UP (ref 3.5–5.3)
RBC # BLD: 3.5 M/UL — LOW (ref 3.8–5.2)
RBC # FLD: 13.5 % — SIGNIFICANT CHANGE UP (ref 10.3–16.9)
SODIUM SERPL-SCNC: 140 MMOL/L — SIGNIFICANT CHANGE UP (ref 135–145)
WBC # BLD: 7.6 K/UL — SIGNIFICANT CHANGE UP (ref 3.8–10.5)
WBC # FLD AUTO: 7.6 K/UL — SIGNIFICANT CHANGE UP (ref 3.8–10.5)

## 2018-05-30 RX ORDER — SENNA PLUS 8.6 MG/1
2 TABLET ORAL AT BEDTIME
Qty: 0 | Refills: 0 | Status: DISCONTINUED | OUTPATIENT
Start: 2018-05-30 | End: 2018-06-01

## 2018-05-30 RX ADMIN — Medication 100 MILLIGRAM(S): at 21:33

## 2018-05-30 RX ADMIN — OXYCODONE HYDROCHLORIDE 10 MILLIGRAM(S): 5 TABLET ORAL at 00:20

## 2018-05-30 RX ADMIN — OXYCODONE HYDROCHLORIDE 10 MILLIGRAM(S): 5 TABLET ORAL at 07:25

## 2018-05-30 RX ADMIN — OXYCODONE HYDROCHLORIDE 10 MILLIGRAM(S): 5 TABLET ORAL at 09:49

## 2018-05-30 RX ADMIN — Medication 100 MILLIGRAM(S): at 06:26

## 2018-05-30 RX ADMIN — Medication 325 MILLIGRAM(S): at 17:23

## 2018-05-30 RX ADMIN — OXYCODONE HYDROCHLORIDE 10 MILLIGRAM(S): 5 TABLET ORAL at 06:28

## 2018-05-30 RX ADMIN — OXYCODONE HYDROCHLORIDE 10 MILLIGRAM(S): 5 TABLET ORAL at 14:28

## 2018-05-30 RX ADMIN — Medication 15 MILLIGRAM(S): at 17:23

## 2018-05-30 RX ADMIN — Medication 100 MILLIGRAM(S): at 06:25

## 2018-05-30 RX ADMIN — Medication 15 MILLIGRAM(S): at 06:45

## 2018-05-30 RX ADMIN — Medication 4: at 12:07

## 2018-05-30 RX ADMIN — Medication 100 MILLIGRAM(S): at 14:27

## 2018-05-30 RX ADMIN — OXYCODONE HYDROCHLORIDE 10 MILLIGRAM(S): 5 TABLET ORAL at 17:23

## 2018-05-30 RX ADMIN — Medication 15 MILLIGRAM(S): at 12:10

## 2018-05-30 RX ADMIN — OXYCODONE HYDROCHLORIDE 5 MILLIGRAM(S): 5 TABLET ORAL at 19:40

## 2018-05-30 RX ADMIN — ATORVASTATIN CALCIUM 10 MILLIGRAM(S): 80 TABLET, FILM COATED ORAL at 21:33

## 2018-05-30 RX ADMIN — OXYCODONE HYDROCHLORIDE 10 MILLIGRAM(S): 5 TABLET ORAL at 15:00

## 2018-05-30 RX ADMIN — SENNA PLUS 2 TABLET(S): 8.6 TABLET ORAL at 21:33

## 2018-05-30 RX ADMIN — PANTOPRAZOLE SODIUM 40 MILLIGRAM(S): 20 TABLET, DELAYED RELEASE ORAL at 09:46

## 2018-05-30 RX ADMIN — OXYCODONE HYDROCHLORIDE 5 MILLIGRAM(S): 5 TABLET ORAL at 18:48

## 2018-05-30 RX ADMIN — SODIUM CHLORIDE 95 MILLILITER(S): 9 INJECTION, SOLUTION INTRAVENOUS at 06:14

## 2018-05-30 RX ADMIN — ONDANSETRON 4 MILLIGRAM(S): 8 TABLET, FILM COATED ORAL at 10:19

## 2018-05-30 RX ADMIN — POLYETHYLENE GLYCOL 3350 17 GRAM(S): 17 POWDER, FOR SOLUTION ORAL at 09:46

## 2018-05-30 RX ADMIN — OXYCODONE HYDROCHLORIDE 10 MILLIGRAM(S): 5 TABLET ORAL at 18:10

## 2018-05-30 RX ADMIN — OXYCODONE HYDROCHLORIDE 10 MILLIGRAM(S): 5 TABLET ORAL at 11:14

## 2018-05-30 RX ADMIN — Medication 2: at 17:38

## 2018-05-30 RX ADMIN — Medication 15 MILLIGRAM(S): at 18:23

## 2018-05-30 RX ADMIN — Medication 15 MILLIGRAM(S): at 13:00

## 2018-05-30 RX ADMIN — Medication 325 MILLIGRAM(S): at 06:25

## 2018-05-30 RX ADMIN — Medication 15 MILLIGRAM(S): at 06:28

## 2018-05-30 RX ADMIN — Medication 2: at 21:57

## 2018-05-30 NOTE — PROGRESS NOTE ADULT - SUBJECTIVE AND OBJECTIVE BOX
ORTHO NOTE    [X ] Pt seen/examined.  Son translating at bedside.  [ ] Pt without any complaints/in NAD.    [X ] Pt complains of:  incisional knee pain - meds help.  Was nauseous w/PT, bp stable.        ROS: [ ] Fever  [ ] Chills  [ ] CP [ ] SOB [ ] Dysnea  [ ] Palpitations [ ] Cough [ ] N/V/C/D [ ] Paresthia [ ] Other     [X ] ROS  otherwise negative    .    PHYSICAL EXAM:    Vital Signs Last 24 Hrs  T(C): 36.4 (30 May 2018 09:03), Max: 36.6 (29 May 2018 16:25)  T(F): 97.6 (30 May 2018 09:03), Max: 97.8 (29 May 2018 16:25)  HR: 66 (30 May 2018 09:03) (54 - 70)  BP: 108/52 (30 May 2018 09:03) (103/51 - 140/64)  BP(mean): 89 (29 May 2018 21:42) (80 - 92)  RR: 17 (30 May 2018 09:03) (11 - 20)  SpO2: 94% (30 May 2018 09:03) (92% - 100%)    I&O's Detail    29 May 2018 07:01  -  30 May 2018 07:00  --------------------------------------------------------  IN:    IV PiggyBack: 100 mL    lactated ringers.: 1190 mL    Oral Fluid: 350 mL    Other: 1700 mL  Total IN: 3340 mL    OUT:    Estimated Blood Loss: 20 mL    Voided: 1100 mL  Total OUT: 1120 mL    Total NET: 2220 mL           CAPILLARY BLOOD GLUCOSE  119 (29 May 2018 18:00)      POCT Blood Glucose.: 201 mg/dL (30 May 2018 11:24)  POCT Blood Glucose.: 134 mg/dL (30 May 2018 07:18)  POCT Blood Glucose.: 166 mg/dL (29 May 2018 23:06)  POCT Blood Glucose.: 119 mg/dL (29 May 2018 18:12)  POCT Blood Glucose.: 115 mg/dL (29 May 2018 16:32)  POCT Blood Glucose.: 118 mg/dL (29 May 2018 12:58)                  Neuro:  NAD A and O x 3    Lungs:    CV:    ABD:     Ext:  L knee crycocuff dsg in place, TA/GS/EHL/FHL 5/5 silt    LABS                        9.6    7.6   )-----------( 245      ( 30 May 2018 08:07 )             30.9                                05-30    140  |  100  |  12  ----------------------------<  133<H>  4.6   |  28  |  0.78    Ca    8.7      30 May 2018 08:07        [ ] Other Labs  [ ] None ordered            Please check or [place] when present:  •  Heart Failure:    [ ] Acute        [ ]  Acute on Chronic        [ ] Chronic         [ ] Diastolic     [ ]  Combined    •  BRYAN:     [ ] ATN        [ ]  Renal medullary necrosis       [ ]  Renal cortical necrosis                  [ ] Other pathological Lesion:  •  CKD:  [ ] Stage I   [ ] Stage II  [ ] Stage III    [ ]Stage IV   [ ]  CKD V   [ ]  Other/Unspecified:    •  Abdominal Nutritional Status:   [ ] Malnutrition-See Nutrition note    [ ] Cachexia   [ ]  Other        [ ] Supplement ordered:            [ ] Morbid Obesity: BMI >=40         ASSESSMENT/PLAN:      STATUS POST: L TKA pod 1    CONTINUE:          [ ] PT wbat    [ ] DVT PPX- scd    [ ] Pain Mgt con't current regimen    [ ] Dispo plan- home     IS use.  Bowel regimen.  Antiemetics prn. ORTHO NOTE    [X ] Pt seen/examined.  Son translating at bedside.  [ ] Pt without any complaints/in NAD.    [X ] Pt complains of:  incisional knee pain - meds help.  Was nauseous w/PT, bp stable.   Overnight she desatted to mid 80s, CTA b/l, came up to 90s on n/c.         ROS: [ ] Fever  [ ] Chills  [ ] CP [ ] SOB [ ] Dysnea  [ ] Palpitations [ ] Cough [ ] N/V/C/D [ ] Paresthia [ ] Other     [X ] ROS  otherwise negative    .    PHYSICAL EXAM:    Vital Signs Last 24 Hrs  T(C): 36.4 (30 May 2018 09:03), Max: 36.6 (29 May 2018 16:25)  T(F): 97.6 (30 May 2018 09:03), Max: 97.8 (29 May 2018 16:25)  HR: 66 (30 May 2018 09:03) (54 - 70)  BP: 108/52 (30 May 2018 09:03) (103/51 - 140/64)  BP(mean): 89 (29 May 2018 21:42) (80 - 92)  RR: 17 (30 May 2018 09:03) (11 - 20)  SpO2: 94% (30 May 2018 09:03) (92% - 100%)    I&O's Detail    29 May 2018 07:01  -  30 May 2018 07:00  --------------------------------------------------------  IN:    IV PiggyBack: 100 mL    lactated ringers.: 1190 mL    Oral Fluid: 350 mL    Other: 1700 mL  Total IN: 3340 mL    OUT:    Estimated Blood Loss: 20 mL    Voided: 1100 mL  Total OUT: 1120 mL    Total NET: 2220 mL           CAPILLARY BLOOD GLUCOSE  119 (29 May 2018 18:00)      POCT Blood Glucose.: 201 mg/dL (30 May 2018 11:24)  POCT Blood Glucose.: 134 mg/dL (30 May 2018 07:18)  POCT Blood Glucose.: 166 mg/dL (29 May 2018 23:06)  POCT Blood Glucose.: 119 mg/dL (29 May 2018 18:12)  POCT Blood Glucose.: 115 mg/dL (29 May 2018 16:32)  POCT Blood Glucose.: 118 mg/dL (29 May 2018 12:58)                  Neuro:  NAD A and O x 3    Lungs:    CV:    ABD:     Ext:  L knee crycocuff dsg in place, TA/GS/EHL/FHL 5/5 silt    LABS                        9.6    7.6   )-----------( 245      ( 30 May 2018 08:07 )             30.9                                05-30    140  |  100  |  12  ----------------------------<  133<H>  4.6   |  28  |  0.78    Ca    8.7      30 May 2018 08:07        [ ] Other Labs  [ ] None ordered            Please check or [place] when present:  •  Heart Failure:    [ ] Acute        [ ]  Acute on Chronic        [ ] Chronic         [ ] Diastolic     [ ]  Combined    •  BRYAN:     [ ] ATN        [ ]  Renal medullary necrosis       [ ]  Renal cortical necrosis                  [ ] Other pathological Lesion:  •  CKD:  [ ] Stage I   [ ] Stage II  [ ] Stage III    [ ]Stage IV   [ ]  CKD V   [ ]  Other/Unspecified:    •  Abdominal Nutritional Status:   [ ] Malnutrition-See Nutrition note    [ ] Cachexia   [ ]  Other        [ ] Supplement ordered:            [ ] Morbid Obesity: BMI >=40         ASSESSMENT/PLAN:      STATUS POST: L TKA pod 1    CONTINUE:          [ ] PT wbat    [ ] DVT PPX- scd    [ ] Pain Mgt con't current regimen    [ ] Dispo plan- home     Aggressive IS use - went over how to use IS properly, O2 n/c prn, will monitor.  Bowel regimen.  Antiemetics prn.

## 2018-05-30 NOTE — PHYSICAL THERAPY INITIAL EVALUATION ADULT - GAIT DEVIATIONS NOTED, PT EVAL
decreased step length/increased time in double stance/decreased stride length/decreased velocity of limb motion/decreased LLE push off and knee flexion in swing/decreased thelma

## 2018-05-30 NOTE — PROGRESS NOTE ADULT - SUBJECTIVE AND OBJECTIVE BOX
Seen this AM. Doing well.     Procedure: L TKA  Surgeon: Norma    Pt comfortable, without complaints  Denies CP, SOB, N/V, numbness/tingling     Vital Signs Last 24 Hrs  T(C): 36.4 (30 May 2018 09:03), Max: 36.6 (29 May 2018 16:25)  T(F): 97.6 (30 May 2018 09:03), Max: 97.8 (29 May 2018 16:25)  HR: 66 (30 May 2018 09:03) (54 - 70)  BP: 108/52 (30 May 2018 09:03) (103/51 - 140/64)  BP(mean): 89 (29 May 2018 21:42) (80 - 92)  RR: 17 (30 May 2018 09:03) (11 - 20)  SpO2: 94% (30 May 2018 09:03) (92% - 100%)    Dressing C/D/I  General: Pt Alert and oriented   wwp  Sensation: SILT  Motor: 5/5 EHL/FHL/TA/GS      A/P: 67yFemale POD#1 s/p above procedure  - Stable  - Pain Control  - DVT ppx: ASA  - Post op abx: Ancef  - PT, WBS: WBAT   - F/U AM Labs

## 2018-05-30 NOTE — PHYSICAL THERAPY INITIAL EVALUATION ADULT - PERTINENT HX OF CURRENT PROBLEM, REHAB EVAL
Pt is 68 y/o F with h/o R JOHNNIE 12/2017 2/2 right hip fracture s/p fall, hx of "open heart surgery. Presents with h/o chronic L knee pain 2/2 L knee OA.

## 2018-05-30 NOTE — PHYSICAL THERAPY INITIAL EVALUATION ADULT - ADDITIONAL COMMENTS
Pt lives in house with daughter (works full time), 6 stairs to enter with bilateral rail, 13 steps on to 2nd story. Pt and son deny use of assistive device PTA, however pt has RW from previous JOHNNIE. Pt with HHA M-F 9-2pm to assist with dressing, bathing. Pt reports she does not leave house unsupervised.

## 2018-05-30 NOTE — DISCHARGE NOTE ADULT - CARE PLAN
Principal Discharge DX:	Primary osteoarthritis of left knee  Goal:	improvement after surgery  Assessment and plan of treatment:	see below

## 2018-05-30 NOTE — DISCHARGE NOTE ADULT - CARE PROVIDER_API CALL
Harshal Green), Orthopaedic Surgery  [address] [place] [address]  Phone: [phone]  Fax: [phone]

## 2018-05-30 NOTE — DISCHARGE NOTE ADULT - MEDICATION SUMMARY - MEDICATIONS TO TAKE
I will START or STAY ON the medications listed below when I get home from the hospital:    aspirin 325 mg oral delayed release tablet  -- 1 tab(s) by mouth 2 times a day x 4 weeks from the date of surgery  -- Indication: For Clot prevention    meloxicam 15 mg oral tablet  -- 1 tab(s) by mouth once a day   -- Do not take this drug if you are pregnant.  Obtain medical advice before taking any non-prescription drugs as some may affect the action of this medication.  Take with food or milk.    -- Indication: For Pain/anti inflammatory    oxyCODONE-acetaminophen 5 mg-325 mg oral tablet  -- 1 to 2 tab(s) by mouth every 4 hours, As Needed for moderate to severe pain MDD:eight  -- Caution federal law prohibits the transfer of this drug to any person other  than the person for whom it was prescribed.  May cause drowsiness.  Alcohol may intensify this effect.  Use care when operating dangerous machinery.  This prescription cannot be refilled.  This product contains acetaminophen.  Do not use  with any other product containing acetaminophen to prevent possible liver damage.  Using more of this medication than prescribed may cause serious breathing problems.    -- Indication: For Pain    metFORMIN  -- 500 milligram(s) by mouth 2 times a day  -- Indication: For DM    pravastatin  -- 40 mg by mouth daily  -- Indication: For Hyperlipidemia    famotidine 20 mg oral tablet  -- 1 tab(s) by mouth every 12 hours  -- Indication: For GERD    docusate sodium 100 mg oral capsule  -- 1 cap(s) by mouth 3 times a day  -- Indication: For Constipation

## 2018-05-30 NOTE — PHYSICAL THERAPY INITIAL EVALUATION ADULT - GENERAL OBSERVATIONS, REHAB EVAL
POD #1 L TKA, WBAT. Pt rcvd semi supine, +cryocuff, +tele, on RA with son present. Pt reports 6/10 L knee pain with movement. Pt tolerated session fairly, c/o dizziness, nausea and 1 episode of vomiting. Pt demo amb to bathroom Selam x1 with RW, Selam transfers. Pt returned semi supine, /53, +call bell, in NAD with RN aware and to deliver medication for nausea. FIM gait=1.

## 2018-05-30 NOTE — PHYSICAL THERAPY INITIAL EVALUATION ADULT - CRITERIA FOR SKILLED THERAPEUTIC INTERVENTIONS
rehab potential/therapy frequency/anticipated discharge recommendation/risk reduction/prevention/functional limitations in following categories/predicted duration of therapy intervention/impairments found

## 2018-05-30 NOTE — DISCHARGE NOTE ADULT - ADDITIONAL INSTRUCTIONS
No strenuous activity, heavy lifting, driving, tub bathing, or returning to work until cleared by MD.  You may shower--dressing is waterproof.  Remove dressing after post op day 7, then leave incision open to air.  Follow up with Dr. Green in his office in 2 weeks.  Any staples/sutures will be removed in 2 weeks.  If you don't have a bowel movement by post op day 3, then take Milk of Magnesia (over the counter).  If no bowel movement by at least post op day 5, then use a Dulcolax suppository (over the counter) and/or a Fleets enema--if still no bowel movement, call your MD.  Contact your doctor if you experience: fever greater than 101.5, chills, chest pain, difficulty breathing, bleeding, redness or heat around the incision.    Please follow up with your primary care provider.

## 2018-05-30 NOTE — DISCHARGE NOTE ADULT - PATIENT PORTAL LINK FT
You can access the [email or web address] Patient Portal, offered by [facility], by registering with the following website: [email or web address]

## 2018-05-30 NOTE — PHYSICAL THERAPY INITIAL EVALUATION ADULT - IMPAIRMENTS FOUND, PT EVAL
muscle strength/ergonomics and body mechanics/ROM/aerobic capacity/endurance/joint integrity and mobility/gait, locomotion, and balance

## 2018-05-30 NOTE — DISCHARGE NOTE ADULT - HOSPITAL COURSE
Admitted  Surgery - L TKA 5/29/18  Perioperative abx  Pain control  DVT ppx  PT consult Admitted  Surgery - L TKA 5/29/18  Perioperative abx  Pain control  DVT ppx  PT consult  Med consult

## 2018-05-31 DIAGNOSIS — E78.5 HYPERLIPIDEMIA, UNSPECIFIED: ICD-10-CM

## 2018-05-31 DIAGNOSIS — I25.10 ATHEROSCLEROTIC HEART DISEASE OF NATIVE CORONARY ARTERY WITHOUT ANGINA PECTORIS: ICD-10-CM

## 2018-05-31 DIAGNOSIS — Z98.890 OTHER SPECIFIED POSTPROCEDURAL STATES: ICD-10-CM

## 2018-05-31 LAB
ANION GAP SERPL CALC-SCNC: 11 MMOL/L — SIGNIFICANT CHANGE UP (ref 5–17)
BUN SERPL-MCNC: 11 MG/DL — SIGNIFICANT CHANGE UP (ref 7–23)
CALCIUM SERPL-MCNC: 8.6 MG/DL — SIGNIFICANT CHANGE UP (ref 8.4–10.5)
CHLORIDE SERPL-SCNC: 96 MMOL/L — SIGNIFICANT CHANGE UP (ref 96–108)
CO2 SERPL-SCNC: 28 MMOL/L — SIGNIFICANT CHANGE UP (ref 22–31)
CREAT SERPL-MCNC: 0.8 MG/DL — SIGNIFICANT CHANGE UP (ref 0.5–1.3)
GLUCOSE BLDC GLUCOMTR-MCNC: 147 MG/DL — HIGH (ref 70–99)
GLUCOSE BLDC GLUCOMTR-MCNC: 148 MG/DL — HIGH (ref 70–99)
GLUCOSE BLDC GLUCOMTR-MCNC: 190 MG/DL — HIGH (ref 70–99)
GLUCOSE BLDC GLUCOMTR-MCNC: 228 MG/DL — HIGH (ref 70–99)
GLUCOSE SERPL-MCNC: 155 MG/DL — HIGH (ref 70–99)
HCT VFR BLD CALC: 28.8 % — LOW (ref 34.5–45)
HGB BLD-MCNC: 9.2 G/DL — LOW (ref 11.5–15.5)
MCHC RBC-ENTMCNC: 27.7 PG — SIGNIFICANT CHANGE UP (ref 27–34)
MCHC RBC-ENTMCNC: 31.9 G/DL — LOW (ref 32–36)
MCV RBC AUTO: 86.7 FL — SIGNIFICANT CHANGE UP (ref 80–100)
PLATELET # BLD AUTO: 225 K/UL — SIGNIFICANT CHANGE UP (ref 150–400)
POTASSIUM SERPL-MCNC: 4.8 MMOL/L — SIGNIFICANT CHANGE UP (ref 3.5–5.3)
POTASSIUM SERPL-SCNC: 4.8 MMOL/L — SIGNIFICANT CHANGE UP (ref 3.5–5.3)
RBC # BLD: 3.32 M/UL — LOW (ref 3.8–5.2)
RBC # FLD: 13.9 % — SIGNIFICANT CHANGE UP (ref 10.3–16.9)
SODIUM SERPL-SCNC: 135 MMOL/L — SIGNIFICANT CHANGE UP (ref 135–145)
SURGICAL PATHOLOGY STUDY: SIGNIFICANT CHANGE UP
WBC # BLD: 9.3 K/UL — SIGNIFICANT CHANGE UP (ref 3.8–10.5)
WBC # FLD AUTO: 9.3 K/UL — SIGNIFICANT CHANGE UP (ref 3.8–10.5)

## 2018-05-31 PROCEDURE — 71045 X-RAY EXAM CHEST 1 VIEW: CPT | Mod: 26

## 2018-05-31 PROCEDURE — 99233 SBSQ HOSP IP/OBS HIGH 50: CPT | Mod: GC

## 2018-05-31 RX ORDER — KETOROLAC TROMETHAMINE 30 MG/ML
15 SYRINGE (ML) INJECTION EVERY 6 HOURS
Qty: 0 | Refills: 0 | Status: DISCONTINUED | OUTPATIENT
Start: 2018-05-31 | End: 2018-06-01

## 2018-05-31 RX ORDER — ACETAMINOPHEN 500 MG
975 TABLET ORAL EVERY 8 HOURS
Qty: 0 | Refills: 0 | Status: DISCONTINUED | OUTPATIENT
Start: 2018-05-31 | End: 2018-06-01

## 2018-05-31 RX ADMIN — OXYCODONE HYDROCHLORIDE 10 MILLIGRAM(S): 5 TABLET ORAL at 06:40

## 2018-05-31 RX ADMIN — Medication 325 MILLIGRAM(S): at 06:03

## 2018-05-31 RX ADMIN — CELECOXIB 200 MILLIGRAM(S): 200 CAPSULE ORAL at 17:53

## 2018-05-31 RX ADMIN — Medication 4: at 22:40

## 2018-05-31 RX ADMIN — POLYETHYLENE GLYCOL 3350 17 GRAM(S): 17 POWDER, FOR SOLUTION ORAL at 10:40

## 2018-05-31 RX ADMIN — OXYCODONE HYDROCHLORIDE 10 MILLIGRAM(S): 5 TABLET ORAL at 17:53

## 2018-05-31 RX ADMIN — Medication 2: at 12:55

## 2018-05-31 RX ADMIN — OXYCODONE HYDROCHLORIDE 10 MILLIGRAM(S): 5 TABLET ORAL at 07:34

## 2018-05-31 RX ADMIN — Medication 100 MILLIGRAM(S): at 12:58

## 2018-05-31 RX ADMIN — CELECOXIB 200 MILLIGRAM(S): 200 CAPSULE ORAL at 06:03

## 2018-05-31 RX ADMIN — CELECOXIB 200 MILLIGRAM(S): 200 CAPSULE ORAL at 06:40

## 2018-05-31 RX ADMIN — Medication 975 MILLIGRAM(S): at 21:12

## 2018-05-31 RX ADMIN — Medication 325 MILLIGRAM(S): at 17:53

## 2018-05-31 RX ADMIN — Medication 15 MILLIGRAM(S): at 14:56

## 2018-05-31 RX ADMIN — OXYCODONE HYDROCHLORIDE 10 MILLIGRAM(S): 5 TABLET ORAL at 06:03

## 2018-05-31 RX ADMIN — Medication 100 MILLIGRAM(S): at 06:03

## 2018-05-31 RX ADMIN — PANTOPRAZOLE SODIUM 40 MILLIGRAM(S): 20 TABLET, DELAYED RELEASE ORAL at 09:46

## 2018-05-31 RX ADMIN — SENNA PLUS 2 TABLET(S): 8.6 TABLET ORAL at 21:12

## 2018-05-31 RX ADMIN — ATORVASTATIN CALCIUM 10 MILLIGRAM(S): 80 TABLET, FILM COATED ORAL at 21:12

## 2018-05-31 RX ADMIN — Medication 975 MILLIGRAM(S): at 22:30

## 2018-05-31 RX ADMIN — Medication 975 MILLIGRAM(S): at 10:40

## 2018-05-31 RX ADMIN — Medication 100 MILLIGRAM(S): at 21:12

## 2018-05-31 RX ADMIN — OXYCODONE HYDROCHLORIDE 5 MILLIGRAM(S): 5 TABLET ORAL at 18:49

## 2018-05-31 NOTE — PROGRESS NOTE ADULT - SUBJECTIVE AND OBJECTIVE BOX
ORTHO NOTE    [X ] Pt seen/examined at 8 AM  [ ] Pt without any complaints/in NAD.    [X ] Pt complains of: incisional pain but meds help. She walked farther w/PT this AM session but still limited by pain and dizziness although Bp and HR stable.  Her O2 dropped to 87% RA while walking but came back up to 90s with deep breathing.  O2 was 97% 2L when back in bed.        ROS: [ ] Fever  [ ] Chills  [ ] CP [ ] SOB [ ] Dysnea  [ ] Palpitations [ ] Cough [ ] N/V/C/D [ ] Paresthia [ ] Other     [X ] ROS  otherwise negative    .    PHYSICAL EXAM:    Vital Signs Last 24 Hrs  T(C): 37.2 (31 May 2018 09:12), Max: 37.8 (31 May 2018 04:06)  T(F): 98.9 (31 May 2018 09:12), Max: 100.1 (31 May 2018 04:06)  HR: 95 (31 May 2018 09:12) (59 - 98)  BP: 116/56 (31 May 2018 09:12) (103/51 - 132/56)  BP(mean): --  RR: 17 (31 May 2018 09:12) (17 - 18)  SpO2: 95% (31 May 2018 09:12) (94% - 100%)    I&O's Detail    30 May 2018 07:01  -  31 May 2018 07:00  --------------------------------------------------------  IN:  Total IN: 0 mL    OUT:    Voided: 1350 mL  Total OUT: 1350 mL    Total NET: -1350 mL           CAPILLARY BLOOD GLUCOSE      POCT Blood Glucose.: 147 mg/dL (31 May 2018 06:53)  POCT Blood Glucose.: 154 mg/dL (30 May 2018 21:51)  POCT Blood Glucose.: 151 mg/dL (30 May 2018 17:34)  POCT Blood Glucose.: 201 mg/dL (30 May 2018 11:24)                  Neuro: NAD A and O x 3    Lungs:  CTA b/l    CV: RRR    ABD: soft n/t n/d    Ext:  L knee dsg c/d/i TA/GS/EHL/FHL 5/5 silt     LABS                        9.2    9.3   )-----------( 225      ( 31 May 2018 05:55 )             28.8                                05-31    135  |  96  |  11  ----------------------------<  155<H>  4.8   |  28  |  0.80    Ca    8.6      31 May 2018 05:55        [ ] Other Labs  [ ] None ordered            Please check or [place] when present:  •  Heart Failure:    [ ] Acute        [ ]  Acute on Chronic        [ ] Chronic         [ ] Diastolic     [ ]  Combined    •  BRYAN:     [ ] ATN        [ ]  Renal medullary necrosis       [ ]  Renal cortical necrosis                  [ ] Other pathological Lesion:  •  CKD:  [ ] Stage I   [ ] Stage II  [ ] Stage III    [ ]Stage IV   [ ]  CKD V   [ ]  Other/Unspecified:    •  Abdominal Nutritional Status:   [ ] Malnutrition-See Nutrition note    [ ] Cachexia   [ ]  Other        [ ] Supplement ordered:            [ ] Morbid Obesity: BMI >=40         ASSESSMENT/PLAN:      STATUS POST: L TKA pod 2    CONTINUE:          [ ] PT wbat    [ ] DVT PPX- ASA BID    [ ] Pain Mgt adjusted regimen    [ ] Dispo plan- split plan home vs rehab pending PT progress    O2 n/c prn, encourage IS use, will check CXR, likely atelectasis.  Will give toradol instead of narcotic prior to PT this afternoon.  Added standing tylenol.  Bowel regimen.

## 2018-05-31 NOTE — PROGRESS NOTE ADULT - SUBJECTIVE AND OBJECTIVE BOX
Ortho Note    Pt comfortable, without complaints  Denies CP, SOB, N/V, numbness/tingling     Vital Signs Last 24 Hrs  T(C): 37.8 (31 May 2018 04:06), Max: 37.8 (31 May 2018 04:06)  T(F): 100.1 (31 May 2018 04:06), Max: 100.1 (31 May 2018 04:06)  HR: 98 (31 May 2018 04:06) (59 - 98)  BP: 106/57 (31 May 2018 04:06) (103/51 - 132/56)  BP(mean): --  RR: 17 (31 May 2018 04:06) (17 - 18)  SpO2: 96% (31 May 2018 04:06) (94% - 100%)    Dressing C/D/I  General: Pt Alert and oriented   wwp  Sensation: SILT  Motor: 5/5 EHL/FHL/TA/GS      A/P: 67yFemale s/p L TKA   - Stable  - Pain Control  - DVT ppx: ASA  - Post op abx: Ancef  - PT, WBS: WBAT   - F/U AM Labs

## 2018-05-31 NOTE — PROGRESS NOTE ADULT - SUBJECTIVE AND OBJECTIVE BOX
Interval Events: Reviewed  Patient seen and examined at bedside.    Patient is a 67y old  Female who presents with a chief complaint of Left knee pain/surgery (30 May 2018 11:42)  she got dizzy when she stood up    PAST MEDICAL & SURGICAL HISTORY:  Cardiac disease: HX of open heart surgery ?AA repair  Hyperlipidemia  Diabetes  Calculus of kidney: Nephrolithiasis  History of total hip replacement, right  Other postprocedural status: S/P aortic aneurysm repair  History of surgery to major organs, presenting hazards to health: History of nephrolithotomy with removal of calculi  History of surgery to major organs, presenting hazards to health: History of lithotripsy      MEDICATIONS:  Pulmonary:    Antimicrobials:    Anticoagulants:  aspirin enteric coated 325 milliGRAM(s) Oral two times a day    Cardiac:      Allergies    No Known Drug Allergies  Seafood (Rash)    Intolerances        Vital Signs Last 24 Hrs  T(C): 37.6 (31 May 2018 20:46), Max: 37.8 (31 May 2018 04:06)  T(F): 99.6 (31 May 2018 20:46), Max: 100.1 (31 May 2018 04:06)  HR: 72 (31 May 2018 20:46) (68 - 98)  BP: 122/69 (31 May 2018 20:46) (102/52 - 132/56)  BP(mean): --  RR: 16 (31 May 2018 20:46) (16 - 18)  SpO2: 95% (31 May 2018 20:46) (95% - 100%)    05-30 @ 07:01  -  05-31 @ 07:00  --------------------------------------------------------  IN: 0 mL / OUT: 1350 mL / NET: -1350 mL    05-31 @ 07:01  -  05-31 @ 22:25  --------------------------------------------------------  IN: 0 mL / OUT: 500 mL / NET: -500 mL          LABS:      CBC Full  -  ( 31 May 2018 05:55 )  WBC Count : 9.3 K/uL  Hemoglobin : 9.2 g/dL  Hematocrit : 28.8 %  Platelet Count - Automated : 225 K/uL  Mean Cell Volume : 86.7 fL  Mean Cell Hemoglobin : 27.7 pg  Mean Cell Hemoglobin Concentration : 31.9 g/dL  Auto Neutrophil # : x  Auto Lymphocyte # : x  Auto Monocyte # : x  Auto Eosinophil # : x  Auto Basophil # : x  Auto Neutrophil % : x  Auto Lymphocyte % : x  Auto Monocyte % : x  Auto Eosinophil % : x  Auto Basophil % : x    05-31    135  |  96  |  11  ----------------------------<  155<H>  4.8   |  28  |  0.80    Ca    8.6      31 May 2018 05:55                          RADIOLOGY & ADDITIONAL STUDIES (The following images were personally reviewed):  Gar:                                     No  Urine output:                       adequate  DVT prophylaxis:                 Yes  Flattus:                                  Yes  Bowel movement:              No

## 2018-06-01 VITALS
HEART RATE: 77 BPM | SYSTOLIC BLOOD PRESSURE: 120 MMHG | TEMPERATURE: 98 F | OXYGEN SATURATION: 96 % | RESPIRATION RATE: 16 BRPM | DIASTOLIC BLOOD PRESSURE: 52 MMHG

## 2018-06-01 LAB
ANION GAP SERPL CALC-SCNC: 11 MMOL/L — SIGNIFICANT CHANGE UP (ref 5–17)
BUN SERPL-MCNC: 11 MG/DL — SIGNIFICANT CHANGE UP (ref 7–23)
CALCIUM SERPL-MCNC: 8.6 MG/DL — SIGNIFICANT CHANGE UP (ref 8.4–10.5)
CHLORIDE SERPL-SCNC: 99 MMOL/L — SIGNIFICANT CHANGE UP (ref 96–108)
CO2 SERPL-SCNC: 27 MMOL/L — SIGNIFICANT CHANGE UP (ref 22–31)
CREAT SERPL-MCNC: 0.72 MG/DL — SIGNIFICANT CHANGE UP (ref 0.5–1.3)
GLUCOSE BLDC GLUCOMTR-MCNC: 125 MG/DL — HIGH (ref 70–99)
GLUCOSE BLDC GLUCOMTR-MCNC: 141 MG/DL — HIGH (ref 70–99)
GLUCOSE SERPL-MCNC: 144 MG/DL — HIGH (ref 70–99)
HCT VFR BLD CALC: 28.6 % — LOW (ref 34.5–45)
HGB BLD-MCNC: 9 G/DL — LOW (ref 11.5–15.5)
MCHC RBC-ENTMCNC: 27.5 PG — SIGNIFICANT CHANGE UP (ref 27–34)
MCHC RBC-ENTMCNC: 31.5 G/DL — LOW (ref 32–36)
MCV RBC AUTO: 87.5 FL — SIGNIFICANT CHANGE UP (ref 80–100)
PLATELET # BLD AUTO: 237 K/UL — SIGNIFICANT CHANGE UP (ref 150–400)
POTASSIUM SERPL-MCNC: 4.2 MMOL/L — SIGNIFICANT CHANGE UP (ref 3.5–5.3)
POTASSIUM SERPL-SCNC: 4.2 MMOL/L — SIGNIFICANT CHANGE UP (ref 3.5–5.3)
RBC # BLD: 3.27 M/UL — LOW (ref 3.8–5.2)
RBC # FLD: 14.1 % — SIGNIFICANT CHANGE UP (ref 10.3–16.9)
SODIUM SERPL-SCNC: 137 MMOL/L — SIGNIFICANT CHANGE UP (ref 135–145)
WBC # BLD: 9.2 K/UL — SIGNIFICANT CHANGE UP (ref 3.8–10.5)
WBC # FLD AUTO: 9.2 K/UL — SIGNIFICANT CHANGE UP (ref 3.8–10.5)

## 2018-06-01 PROCEDURE — 80048 BASIC METABOLIC PNL TOTAL CA: CPT

## 2018-06-01 PROCEDURE — 83036 HEMOGLOBIN GLYCOSYLATED A1C: CPT

## 2018-06-01 PROCEDURE — 85027 COMPLETE CBC AUTOMATED: CPT

## 2018-06-01 PROCEDURE — 36415 COLL VENOUS BLD VENIPUNCTURE: CPT

## 2018-06-01 PROCEDURE — 73560 X-RAY EXAM OF KNEE 1 OR 2: CPT

## 2018-06-01 PROCEDURE — 97161 PT EVAL LOW COMPLEX 20 MIN: CPT

## 2018-06-01 PROCEDURE — 82962 GLUCOSE BLOOD TEST: CPT

## 2018-06-01 PROCEDURE — 88300 SURGICAL PATH GROSS: CPT

## 2018-06-01 PROCEDURE — C1776: CPT

## 2018-06-01 PROCEDURE — 71045 X-RAY EXAM CHEST 1 VIEW: CPT

## 2018-06-01 PROCEDURE — 97116 GAIT TRAINING THERAPY: CPT

## 2018-06-01 PROCEDURE — 97110 THERAPEUTIC EXERCISES: CPT

## 2018-06-01 PROCEDURE — C1713: CPT

## 2018-06-01 RX ORDER — METFORMIN HYDROCHLORIDE 850 MG/1
0 TABLET ORAL
Qty: 0 | Refills: 0 | COMMUNITY

## 2018-06-01 RX ORDER — METFORMIN HYDROCHLORIDE 850 MG/1
500 TABLET ORAL
Qty: 0 | Refills: 0 | COMMUNITY

## 2018-06-01 RX ORDER — DOCUSATE SODIUM 100 MG
1 CAPSULE ORAL
Qty: 0 | Refills: 0 | COMMUNITY
Start: 2018-06-01

## 2018-06-01 RX ORDER — ASPIRIN/CALCIUM CARB/MAGNESIUM 324 MG
1 TABLET ORAL
Qty: 0 | Refills: 0 | COMMUNITY
Start: 2018-06-01

## 2018-06-01 RX ORDER — MELOXICAM 15 MG/1
1 TABLET ORAL
Qty: 14 | Refills: 0 | OUTPATIENT
Start: 2018-06-01 | End: 2018-06-14

## 2018-06-01 RX ADMIN — Medication 325 MILLIGRAM(S): at 05:29

## 2018-06-01 RX ADMIN — Medication 975 MILLIGRAM(S): at 06:30

## 2018-06-01 RX ADMIN — OXYCODONE HYDROCHLORIDE 10 MILLIGRAM(S): 5 TABLET ORAL at 17:46

## 2018-06-01 RX ADMIN — Medication 975 MILLIGRAM(S): at 15:54

## 2018-06-01 RX ADMIN — OXYCODONE HYDROCHLORIDE 10 MILLIGRAM(S): 5 TABLET ORAL at 10:25

## 2018-06-01 RX ADMIN — CELECOXIB 200 MILLIGRAM(S): 200 CAPSULE ORAL at 17:45

## 2018-06-01 RX ADMIN — OXYCODONE HYDROCHLORIDE 10 MILLIGRAM(S): 5 TABLET ORAL at 06:30

## 2018-06-01 RX ADMIN — OXYCODONE HYDROCHLORIDE 10 MILLIGRAM(S): 5 TABLET ORAL at 09:55

## 2018-06-01 RX ADMIN — Medication 100 MILLIGRAM(S): at 05:28

## 2018-06-01 RX ADMIN — POLYETHYLENE GLYCOL 3350 17 GRAM(S): 17 POWDER, FOR SOLUTION ORAL at 12:45

## 2018-06-01 RX ADMIN — Medication 100 MILLIGRAM(S): at 15:54

## 2018-06-01 RX ADMIN — PANTOPRAZOLE SODIUM 40 MILLIGRAM(S): 20 TABLET, DELAYED RELEASE ORAL at 12:45

## 2018-06-01 RX ADMIN — CELECOXIB 200 MILLIGRAM(S): 200 CAPSULE ORAL at 06:30

## 2018-06-01 RX ADMIN — Medication 975 MILLIGRAM(S): at 05:28

## 2018-06-01 RX ADMIN — OXYCODONE HYDROCHLORIDE 10 MILLIGRAM(S): 5 TABLET ORAL at 05:28

## 2018-06-01 RX ADMIN — CELECOXIB 200 MILLIGRAM(S): 200 CAPSULE ORAL at 05:29

## 2018-06-01 RX ADMIN — Medication 325 MILLIGRAM(S): at 17:45

## 2018-06-01 NOTE — PROGRESS NOTE ADULT - PROVIDER SPECIALTY LIST ADULT
Orthopedics
Internal Medicine

## 2018-06-01 NOTE — DIETITIAN INITIAL EVALUATION ADULT. - ENERGY NEEDS
Ht:5ft 3inches,IBW:115lbs+/-10%,145% of IBW.Adjusted for weight loss and increase protein needs due to s/p surgical needs.

## 2018-06-01 NOTE — PROGRESS NOTE ADULT - SUBJECTIVE AND OBJECTIVE BOX
Ortho Note    Pain endorsed but controlled with meds this am     Pt comfortable, without complaints  Denies CP, SOB, N/V, numbness/tingling     Vital Signs Last 24 Hrs  T(C): 36.2 (01 Jun 2018 05:15), Max: 37.6 (31 May 2018 20:46)  T(F): 97.2 (01 Jun 2018 05:15), Max: 99.6 (31 May 2018 20:46)  HR: 64 (01 Jun 2018 05:15) (64 - 95)  BP: 126/55 (01 Jun 2018 05:15) (102/52 - 127/57)  BP(mean): --  RR: 16 (01 Jun 2018 05:15) (16 - 18)  SpO2: 94% (01 Jun 2018 05:15) (94% - 98%)    Dressing C/D/I  General: Pt Alert and oriented   wwp  Sensation: SILT  Motor: 5/5 EHL/FHL/TA/GS      A/P: 67yFemale s/p L TKA   - Stable  - Pain Control  - DVT ppx: ASA  - Post op abx: Ancef  - PT, WBS: WBAT   - F/U AM Labs

## 2018-06-01 NOTE — DIETITIAN INITIAL EVALUATION ADULT. - OTHER INFO
68 y/o female admitted for Left TKA.Complaints of left knee pain.No N/V/D .Skin (surgical incision).Claims to watch salt and sugar in diet at home.HGA1C noted.

## 2018-06-01 NOTE — PROGRESS NOTE ADULT - SUBJECTIVE AND OBJECTIVE BOX
ORTHO NOTE    [X ] Pt seen/examined.  [ ] Pt without any complaints/in NAD.    [X ] Pt complains of:  incisional pain better controlled today      ROS: [ ] Fever  [ ] Chills  [ ] CP [ ] SOB [ ] Dysnea  [ ] Palpitations [ ] Cough [ ] N/V/C/D [ ] Paresthia [ ] Other     [X ] ROS  otherwise negative    .    PHYSICAL EXAM:    Vital Signs Last 24 Hrs  T(C): 36.5 (01 Jun 2018 09:47), Max: 37.6 (31 May 2018 20:46)  T(F): 97.7 (01 Jun 2018 09:47), Max: 99.6 (31 May 2018 20:46)  HR: 60 (01 Jun 2018 09:47) (60 - 75)  BP: 100/57 (01 Jun 2018 09:47) (100/57 - 127/57)  BP(mean): --  RR: 16 (01 Jun 2018 09:47) (16 - 18)  SpO2: 99% (01 Jun 2018 09:47) (94% - 99%)    I&O's Detail    31 May 2018 07:01  -  01 Jun 2018 07:00  --------------------------------------------------------  IN:  Total IN: 0 mL    OUT:    Voided: 1000 mL  Total OUT: 1000 mL    Total NET: -1000 mL           CAPILLARY BLOOD GLUCOSE      POCT Blood Glucose.: 125 mg/dL (01 Jun 2018 06:56)  POCT Blood Glucose.: 228 mg/dL (31 May 2018 22:09)  POCT Blood Glucose.: 148 mg/dL (31 May 2018 17:46)  POCT Blood Glucose.: 190 mg/dL (31 May 2018 11:58)                  Neuro:  NAD A and O x 3    Lungs:  CTA b/l    CV: RRR    ABD: soft n/t n/d +BS    Ext:  L knee dsg c/d/i TA/GS/EHL/FHL 5/5 silt     LABS                        9.0    9.2   )-----------( 237      ( 01 Jun 2018 06:39 )             28.6                                06-01    137  |  99  |  11  ----------------------------<  144<H>  4.2   |  27  |  0.72    Ca    8.6      01 Jun 2018 06:39        [ ] Other Labs  [ ] None ordered            Please check or [place] when present:  •  Heart Failure:    [ ] Acute        [ ]  Acute on Chronic        [ ] Chronic         [ ] Diastolic     [ ]  Combined    •  BRYAN:     [ ] ATN        [ ]  Renal medullary necrosis       [ ]  Renal cortical necrosis                  [ ] Other pathological Lesion:  •  CKD:  [ ] Stage I   [ ] Stage II  [ ] Stage III    [ ]Stage IV   [ ]  CKD V   [ ]  Other/Unspecified:    •  Abdominal Nutritional Status:   [ ] Malnutrition-See Nutrition note    [ ] Cachexia   [ ]  Other        [ ] Supplement ordered:            [ ] Morbid Obesity: BMI >=40         ASSESSMENT/PLAN:      STATUS POST:  L TKA pod 3    CONTINUE:          [ ] PT wbat    [ ] DVT PPX-  BID    [ ] Pain Mgt con't current regimen    [ ] Dispo plan-     IS use.  Bowel regimen. ORTHO NOTE    [X ] Pt seen/examined.  [ ] Pt without any complaints/in NAD.    [X ] Pt complains of:  incisional pain better controlled today      ROS: [ ] Fever  [ ] Chills  [ ] CP [ ] SOB [ ] Dysnea  [ ] Palpitations [ ] Cough [ ] N/V/C/D [ ] Paresthia [ ] Other     [X ] ROS  otherwise negative    .    PHYSICAL EXAM:    Vital Signs Last 24 Hrs  T(C): 36.5 (01 Jun 2018 09:47), Max: 37.6 (31 May 2018 20:46)  T(F): 97.7 (01 Jun 2018 09:47), Max: 99.6 (31 May 2018 20:46)  HR: 60 (01 Jun 2018 09:47) (60 - 75)  BP: 100/57 (01 Jun 2018 09:47) (100/57 - 127/57)  BP(mean): --  RR: 16 (01 Jun 2018 09:47) (16 - 18)  SpO2: 99% (01 Jun 2018 09:47) (94% - 99%)    I&O's Detail    31 May 2018 07:01  -  01 Jun 2018 07:00  --------------------------------------------------------  IN:  Total IN: 0 mL    OUT:    Voided: 1000 mL  Total OUT: 1000 mL    Total NET: -1000 mL           CAPILLARY BLOOD GLUCOSE      POCT Blood Glucose.: 125 mg/dL (01 Jun 2018 06:56)  POCT Blood Glucose.: 228 mg/dL (31 May 2018 22:09)  POCT Blood Glucose.: 148 mg/dL (31 May 2018 17:46)  POCT Blood Glucose.: 190 mg/dL (31 May 2018 11:58)                  Neuro:  NAD A and O x 3    Lungs:  CTA b/l    CV: RRR    ABD: soft n/t n/d +BS    Ext:  L knee dsg c/d/i TA/GS/EHL/FHL 5/5 silt     LABS                        9.0    9.2   )-----------( 237      ( 01 Jun 2018 06:39 )             28.6                                06-01    137  |  99  |  11  ----------------------------<  144<H>  4.2   |  27  |  0.72    Ca    8.6      01 Jun 2018 06:39        [ ] Other Labs  [ ] None ordered            Please check or [place] when present:  •  Heart Failure:    [ ] Acute        [ ]  Acute on Chronic        [ ] Chronic         [ ] Diastolic     [ ]  Combined    •  BRYAN:     [ ] ATN        [ ]  Renal medullary necrosis       [ ]  Renal cortical necrosis                  [ ] Other pathological Lesion:  •  CKD:  [ ] Stage I   [ ] Stage II  [ ] Stage III    [ ]Stage IV   [ ]  CKD V   [ ]  Other/Unspecified:    •  Abdominal Nutritional Status:   [ ] Malnutrition-See Nutrition note    [ ] Cachexia   [ ]  Other        [ ] Supplement ordered:            [ ] Morbid Obesity: BMI >=40         ASSESSMENT/PLAN:      STATUS POST:  L TKA pod 3    CONTINUE:          [ ] PT wbat    [ ] DVT PPX-  BID    [ ] Pain Mgt con't current regimen    [ ] Dispo plan- home vs rehab    IS use.  Bowel regimen.  Dr Hooker is following for med.  O2 n/c prn.

## 2018-06-01 NOTE — DIETITIAN INITIAL EVALUATION ADULT. - NS AS NUTRI INTERV MEALS SNACK
General/healthful diet/Protein - modified diet/add DASH restriction to diet order/Energy - modified diet

## 2018-06-04 DIAGNOSIS — N20.0 CALCULUS OF KIDNEY: ICD-10-CM

## 2018-06-04 DIAGNOSIS — E78.5 HYPERLIPIDEMIA, UNSPECIFIED: ICD-10-CM

## 2018-06-04 DIAGNOSIS — Z83.3 FAMILY HISTORY OF DIABETES MELLITUS: ICD-10-CM

## 2018-06-04 DIAGNOSIS — I25.10 ATHEROSCLEROTIC HEART DISEASE OF NATIVE CORONARY ARTERY WITHOUT ANGINA PECTORIS: ICD-10-CM

## 2018-06-04 DIAGNOSIS — Z98.890 OTHER SPECIFIED POSTPROCEDURAL STATES: ICD-10-CM

## 2018-06-04 DIAGNOSIS — E66.9 OBESITY, UNSPECIFIED: ICD-10-CM

## 2018-06-04 DIAGNOSIS — M17.12 UNILATERAL PRIMARY OSTEOARTHRITIS, LEFT KNEE: ICD-10-CM

## 2018-06-04 DIAGNOSIS — E11.9 TYPE 2 DIABETES MELLITUS WITHOUT COMPLICATIONS: ICD-10-CM

## 2018-06-04 DIAGNOSIS — Z96.641 PRESENCE OF RIGHT ARTIFICIAL HIP JOINT: ICD-10-CM

## 2019-06-02 NOTE — DISCHARGE NOTE ADULT - NS AS ACTIVITY OBS
with patient No Heavy lifting/straining/Do not make important decisions/Do not drive or operate machinery

## 2020-02-27 ENCOUNTER — TRANSCRIPTION ENCOUNTER (OUTPATIENT)
Age: 70
End: 2020-02-27

## 2020-02-27 PROBLEM — I51.9 HEART DISEASE, UNSPECIFIED: Chronic | Status: ACTIVE | Noted: 2018-05-29

## 2020-02-28 ENCOUNTER — OUTPATIENT (OUTPATIENT)
Dept: OUTPATIENT SERVICES | Facility: HOSPITAL | Age: 70
LOS: 1 days | Discharge: HOME | End: 2020-02-28

## 2020-02-28 DIAGNOSIS — Z96.641 PRESENCE OF RIGHT ARTIFICIAL HIP JOINT: Chronic | ICD-10-CM

## 2020-02-28 LAB — GLUCOSE BLDC GLUCOMTR-MCNC: 106 MG/DL — HIGH (ref 70–99)

## 2020-02-28 RX ORDER — PHENAZOPYRIDINE HCL 100 MG
1 TABLET ORAL
Qty: 21 | Refills: 0
Start: 2020-02-28 | End: 2020-03-05

## 2020-06-12 NOTE — DIETITIAN INITIAL EVALUATION ADULT. - PROBLEM SELECTOR PLAN 1
"Message left for patient requesting a return call to verify refill request.   Contacted pharmacy and spoke with Ileana who states, \"This was not on auto-refill. I think his mom came in and requested a refill\". Will route to PCP for consideration    Triamcinolone (KENALOG) 0.1 % cream- located in history with a discontinued date of 08/19/2019.   Last Office Visit: 05/29/2020  Future Office visit:    Next 5 appointments (look out 90 days)    Jul 02, 2020 10:40 AM CDT  Return Visit with Juan Carlos Matthews MD  [facility] ([facility]) [address]  [place] [id number] [phone]           Routing refill request to provider for review/approval because:  Drug not active on patient's medication list    Unable to complete prescription refill per RNMedication Refill Policy.................... Kenzie Escalante RN ....................  6/12/2020   11:40 AM          " Admit to ortho for left partial vs total knee replacement. Medically cleared as outpatient.

## 2020-09-22 NOTE — PHYSICAL THERAPY INITIAL EVALUATION ADULT - PATIENT PROFILE REVIEW, REHAB EVAL
yes Oxybutynin Counseling:  I discussed with the patient the risks of oxybutynin including but not limited to skin rash, drowsiness, dry mouth, difficulty urinating, and blurred vision.

## 2021-06-22 NOTE — PATIENT PROFILE ADULT. - NS MD HP INPLANTS MED DEV
Problem: Knowledge Deficit  Goal: Patient/family/caregiver demonstrates understanding of disease process, treatment plan, medications, and discharge instructions  Description: Complete learning assessment and assess knowledge base    Interventions:  - Provide teaching at level of understanding  - Provide teaching via preferred learning methods  Outcome: Progressing None

## 2022-06-15 NOTE — PATIENT PROFILE ADULT. - NS PRO PT REFERRAL QUES 4 YN
69F pmh of pulmonary hypertension, HTN, HFpEF (echo 60% PASP 79mmhg), COPD  (8L), Afib (in digoxin, diltiazem), HLD, prior spont pneumothorax, and CKD who presents as transfer from [facility] with 1-2 weeks of fatigue, lethargy, and progressively worsening RINALDI. Over the past year, the patient has had a long standing history of worsening SOB that has been attributed to her heart failure. no

## 2022-11-13 ENCOUNTER — NON-APPOINTMENT (OUTPATIENT)
Age: 72
End: 2022-11-13

## 2023-02-03 NOTE — ASU PATIENT PROFILE, ADULT - NS PRO MODE OF ARRIVAL
H/o decreased renal function on the left.  No new pains
She did well on macrobid. We will switch to doxycycline for the next 90 days.
She is eating better and has gained some weight. PEG feedings as well.
ambulatory

## 2023-09-12 NOTE — PHYSICAL THERAPY INITIAL EVALUATION ADULT - ONSET DATE, REHAB EVAL
[age] yo M unable to eat ,  unable to use the bathroom at times, hemorrhoid's, side pain  ----- Message from Chio Zarate sent at 2023  3:14 PM CDT -----  PATIENT HAD AN APPOINTMENT TODAY BUT WAS NOT AWARE    Patient Name: Ulises Sims  Caller: self  Call Back # : see above    Is this for an Active episode for Home Health, Hospice or Palliative Care? No   Symptomatic: Yes   Specialist or PCP Name:  Richard Kang MD  Symptoms: 58 yo M unable to eat ,  unable to use the bathroom at times, hemorrhoid's, side pain  Pregnant (females aged 13-60. If Yes, how long?) : n/a  Name of Clinic Site / Acct# : [facility] [address]         Use following scripting for patients waiting for a callback:   \"Nurse callback times vary based on call volumes; please be aware the return phone call may come from an unidentified or out of state phone number. If your symptoms worsen or become life threatening while waiting, you should seek immediate assistance by calling 911 or going to the ER for evaluation.\"          Are you currently at (or near) your place of residence? Yes [facility] [id number]     29-May-2018

## 2024-03-23 NOTE — DISCHARGE NOTE ADULT - ABILITY TO HEAR (WITH HEARING AID OR HEARING APPLIANCE IF NORMALLY USED):
Adequate: hears normal conversation without difficulty [Negative] : negative Zohaib's [5___] : quadriceps 5[unfilled]/5 [Right] : right knee [] : light touch is intact throughout [All Views] : anteroposterior, lateral, skyline, and anteroposterior standing [There are no fractures, subluxations or dislocations. No significant abnormalities are seen] : There are no fractures, subluxations or dislocations. No significant abnormalities are seen [[id number]] : flexion 115 degrees

## 2024-12-09 NOTE — PHYSICAL THERAPY INITIAL EVALUATION ADULT - LEVEL OF INDEPENDENCE: SUPINE/SIT, REHAB EVAL
PAST MEDICAL HISTORY:  Asthma     Bipolar disorder     Diabetes     Endometriosis     Factor V Leiden     GERD (gastroesophageal reflux disease)     History of DVT in adulthood RLE on eliquis    HTN (hypertension)     Hypothyroid     Insomnia     Seasonal allergies     Seizure      minimum assist (75% patients effort)